# Patient Record
Sex: FEMALE | Race: WHITE | NOT HISPANIC OR LATINO | URBAN - METROPOLITAN AREA
[De-identification: names, ages, dates, MRNs, and addresses within clinical notes are randomized per-mention and may not be internally consistent; named-entity substitution may affect disease eponyms.]

---
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
## 2022-01-12 ENCOUNTER — OFFICE VISIT (OUTPATIENT)
Dept: FAMILY MEDICINE | Facility: CLINIC | Age: 22
End: 2022-01-12

## 2022-01-12 VITALS
TEMPERATURE: 97.8 F | BODY MASS INDEX: 23.25 KG/M2 | DIASTOLIC BLOOD PRESSURE: 77 MMHG | HEART RATE: 79 BPM | HEIGHT: 69 IN | RESPIRATION RATE: 18 BRPM | WEIGHT: 157 LBS | SYSTOLIC BLOOD PRESSURE: 123 MMHG | OXYGEN SATURATION: 97 %

## 2022-01-12 DIAGNOSIS — Z13.220 SCREENING FOR LIPID DISORDERS: ICD-10-CM

## 2022-01-12 DIAGNOSIS — F90.9 ATTENTION DEFICIT HYPERACTIVITY DISORDER (ADHD), UNSPECIFIED ADHD TYPE: ICD-10-CM

## 2022-01-12 DIAGNOSIS — Z11.3 SCREEN FOR STD (SEXUALLY TRANSMITTED DISEASE): ICD-10-CM

## 2022-01-12 DIAGNOSIS — D50.9 IRON DEFICIENCY ANEMIA, UNSPECIFIED IRON DEFICIENCY ANEMIA TYPE: ICD-10-CM

## 2022-01-12 DIAGNOSIS — F33.42 RECURRENT MAJOR DEPRESSIVE DISORDER, IN FULL REMISSION (H): ICD-10-CM

## 2022-01-12 DIAGNOSIS — Z13.1 SCREENING FOR DIABETES MELLITUS: ICD-10-CM

## 2022-01-12 DIAGNOSIS — Z00.00 ROUTINE GENERAL MEDICAL EXAMINATION AT A HEALTH CARE FACILITY: Primary | ICD-10-CM

## 2022-01-12 DIAGNOSIS — Z13.228 SCREENING FOR ENDOCRINE, METABOLIC AND IMMUNITY DISORDER: ICD-10-CM

## 2022-01-12 DIAGNOSIS — Z86.59 HISTORY OF EATING DISORDER: ICD-10-CM

## 2022-01-12 DIAGNOSIS — Z71.89 ADVANCED CARE PLANNING/COUNSELING DISCUSSION: ICD-10-CM

## 2022-01-12 DIAGNOSIS — Z13.0 SCREENING FOR ENDOCRINE, METABOLIC AND IMMUNITY DISORDER: ICD-10-CM

## 2022-01-12 DIAGNOSIS — Z13.29 SCREENING FOR ENDOCRINE, METABOLIC AND IMMUNITY DISORDER: ICD-10-CM

## 2022-01-12 LAB
% GRANULOCYTES: 60.5 % (ref 42.2–75.2)
HCT VFR BLD AUTO: 46.7 % (ref 35–46)
HEMOGLOBIN: 15.3 G/DL (ref 11.8–15.5)
LYMPHOCYTES NFR BLD AUTO: 30.6 % (ref 20.5–51.1)
MCH RBC QN AUTO: 28.8 PG (ref 27–31)
MCHC RBC AUTO-ENTMCNC: 32.8 G/DL (ref 33–37)
MCV RBC AUTO: 87.8 FL (ref 80–100)
MONOCYTES NFR BLD AUTO: 8.9 % (ref 1.7–9.3)
PLATELET # BLD AUTO: 287 K/UL (ref 140–450)
RBC # BLD AUTO: 5.32 X10/CMM (ref 3.7–5.2)
WBC # BLD AUTO: 6 X10/CMM (ref 3.8–11)

## 2022-01-12 PROCEDURE — 99385 PREV VISIT NEW AGE 18-39: CPT | Performed by: NURSE PRACTITIONER

## 2022-01-12 PROCEDURE — 36415 COLL VENOUS BLD VENIPUNCTURE: CPT | Performed by: NURSE PRACTITIONER

## 2022-01-12 PROCEDURE — 85025 COMPLETE CBC W/AUTO DIFF WBC: CPT | Performed by: NURSE PRACTITIONER

## 2022-01-12 RX ORDER — NORETHINDRONE ACETATE AND ETHINYL ESTRADIOL 1MG-20(21)
1 KIT ORAL DAILY
COMMUNITY
End: 2022-04-06

## 2022-01-12 RX ORDER — LISDEXAMFETAMINE DIMESYLATE 40 MG/1
40 CAPSULE ORAL EVERY MORNING
COMMUNITY
End: 2022-04-06

## 2022-01-12 ASSESSMENT — ANXIETY QUESTIONNAIRES
1. FEELING NERVOUS, ANXIOUS, OR ON EDGE: NOT AT ALL
2. NOT BEING ABLE TO STOP OR CONTROL WORRYING: NOT AT ALL
GAD7 TOTAL SCORE: 0
7. FEELING AFRAID AS IF SOMETHING AWFUL MIGHT HAPPEN: NOT AT ALL
3. WORRYING TOO MUCH ABOUT DIFFERENT THINGS: NOT AT ALL
IF YOU CHECKED OFF ANY PROBLEMS ON THIS QUESTIONNAIRE, HOW DIFFICULT HAVE THESE PROBLEMS MADE IT FOR YOU TO DO YOUR WORK, TAKE CARE OF THINGS AT HOME, OR GET ALONG WITH OTHER PEOPLE: NOT DIFFICULT AT ALL
5. BEING SO RESTLESS THAT IT IS HARD TO SIT STILL: NOT AT ALL
6. BECOMING EASILY ANNOYED OR IRRITABLE: NOT AT ALL

## 2022-01-12 ASSESSMENT — PATIENT HEALTH QUESTIONNAIRE - PHQ9
SUM OF ALL RESPONSES TO PHQ QUESTIONS 1-9: 0
5. POOR APPETITE OR OVEREATING: NOT AT ALL

## 2022-01-12 ASSESSMENT — MIFFLIN-ST. JEOR: SCORE: 1531.53

## 2022-01-12 NOTE — PATIENT INSTRUCTIONS
Recommend Vitamin D3 2000 international unit(s) daily (can get at the store in pill or gummy form)    Preventive Health Recommendations  Female Ages 21 to 25     Yearly exam:     See your health care provider every year in order to  o Review health changes.   o Discuss preventive care.    o Review your medicines if your doctor has prescribed any.      You should be tested each year for STDs (sexually transmitted diseases).       Talk to your provider about how often you should have cholesterol testing.      Get a Pap test every three years. If you have an abnormal result, your doctor may have you test more often.      If you are at risk for diabetes, you should have a diabetes test (fasting glucose).     Shots:     Get a flu shot each year.     Get a tetanus shot every 10 years.     Consider getting the shot (vaccine) that prevents cervical cancer (Gardasil).    Nutrition:     Eat at least 5 servings of fruits and vegetables each day.    Eat whole-grain bread, whole-wheat pasta and brown rice instead of white grains and rice.    Get adequate Calcium and Vitamin D.     Lifestyle    Exercise at least 150 minutes a week each week (30 minutes a day, 5 days a week). This will help you control your weight and prevent disease.    Limit alcohol to one drink per day.    No smoking.     Wear sunscreen to prevent skin cancer.    See your dentist every six months for an exam and cleaning.    Thank you for coming in today!     We are working to improve our digital reputation.  Would you please help us by reviewing our clinic on [employer] and/or [employer]?  These are links for filling out a review for the clinic:    [email or web address]                 [email or web address]    We truly appreciate you taking the time to do this.     General Information:    Today you had your appointment with Ciarra Hollis CNP  My hours are:    Monday 8 AM - 5 PM  Wednesday: 8 AM - 5 PM  Thursday:  8 AM - 5 PM  Fridays: 8 AM - 5 PM    I am not in the office Tuesdays. Therefore non urgent calls received on Tuesday will be addressed when I am back in the office on Wednesday.     If lab work was done today as part of your evaluation you will generally be contacted via My Chart, mail, or phone with the results within 1-5 days. If there is an alarming result we will contact you by phone. Lab results come back at varying times, I generally wait until all labs are resulted before making comments on results. Please note labs are automatically released to My Chart once available.     If you need refills please contact your pharmacy They will send a refill request to me to review. Please allow 3 business days for us to process all refill requests.     Please call or send a medical message with any questions or concerns

## 2022-01-12 NOTE — PROGRESS NOTES
"Female Preventive Health Visit    SUBJECTIVE:    This 21 year old female presents for a routine preventive physical exam.    The patient has the following concerns:   1. Iron deficiency anemia - taking Ferrous Sulfate 325 mg daily. History of eating disorder - anorexia, bulimia, over-exercising. Knows her triggers but uncomfortable discussing weight.   2. Mental Health - taking Sertraline 75 mg daily for depression. Takes Vyvanse for ADHD. Currently prescribed by provider at her school.   PHQ 2022   PHQ-9 Total Score 0   Q9: Thoughts of better off dead/self-harm past 2 weeks Not at all     EVELIA-7 SCORE 2022   Total Score 0       OB/Gyn History:      Last Pap Smear:  \"in the last 3 years\" at [facility] in [place]. MARCELA signed for records. No history of abnormal pap smears.  Gyn Surgery:  None.  Current Contraceptive Method:  Junel combination ocp & condoms when having sex. Not currently sexually active.    Patient's medications, allergies, past medical, surgical, social and family histories were reviewed and updated as appropriate.    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 Has one semester left. Mother goes to [employer]. Patient is an only child.       Health Maintenance:  Health maintenance alerts were reviewed and updated as appropriate.    Do you get at least three servings of calcium containing foods daily " "(dairy, green leafy vegetables, etc.)? yes    Amount of exercise or daily activities, outside of work: 3 day(s) per week    Problems taking medications regularly No    Medication side effects: No    Have you had an eye exam in the past two years? yes    Do you see a dentist twice per year? yes    Do you have sleep apnea, excessive snoring or daytime drowsiness?no    OBJECTIVE:  Vitals:    01/12/22 0811   BP: 123/77   Pulse: 79   Resp: 18   Temp: 97.8  F (36.6  C)   TempSrc: Temporal   SpO2: 97%   Weight: 71.2 kg (157 lb)   Height: 1.753 m (5' 9\")    Body mass index is 23.18 kg/m .  General: Young adult female, no acute distress, cooperative with exam.  Eyes: no injection or drainage. PERRLA  Ears: TM's and canals show no erythema, discharge, or effusion bilaterally.  Throat: moist mucous membranes, no tonsillar exudate or hypertrophy, posterior oral pharynx non-erythematous without lesions.  Neck: supple, no thyroid nodules or enlargement.  Breasts: bilateral breasts appear normal, no suspicious masses, no skin or nipple changes  Lungs: clear to auscultation bilaterally, normal respiratory effort.  Heart: regular rate & rhythm, normal S1 and S2, no murmurs.   Abdomen: normal bowel sounds, nontender, no palpable organomegaly.  Genitourinary: declined exam due to having period currently  Extremities: warm, perfused, no swelling or edema.  Neuro: grossly intact   Psych: mental status normal, mood and affect appropriate.    ASSESSMENT / PLAN:  This 21 year old female presents for a routine preventive physical exam. Preventive health topics discussed including adequate exercise and healthy diet. Return to clinic in one year for preventative exam or sooner with any other concerns.  Other issues discussed as noted below.    Cara was seen today for consult, physical and std.    Diagnoses and all orders for this visit:    Routine general medical examination at a health care facility  Age-appropriate preventative health " "maintenance along with diet, exercise and healthy weight discussed. MARCELA signed for pap records and immunization records    Iron deficiency anemia, unspecified iron deficiency anemia type  -     CBC with Diff/Plt ([facility])  -     Ferritin  Serum ([facility])  -     Iron and TIBC ([facility])  -     VENOUS COLLECTION  Taking daily ferrous sulfate. Thinks in part due to eating disorder. History of heavy periods but not as much since on contraceptive. Would like levels checked today    Attention deficit hyperactivity disorder (ADHD), unspecified ADHD type  Taking Vyvanse 40 mg daily with good effect. Prescribed by provider at her school. Eventually wants to switch prescription to our clinic.    Recurrent major depressive disorder, in full remission (H)  -     sertraline (ZOLOFT) 50 MG tablet; Take 1.5 tablets (75 mg) by mouth daily  -     DEPRESSION ACTION PLAN (DAP)  Sertraline 75 mg helping with symptoms    History of eating disorder  Known issue that I take into account for her medical decisions, no current exacerbations or new concerns.    Screen for STD (sexually transmitted disease)  -     Chlamydia/GC Amplification ([facility])  -     HIV 1/0/2 Rflx ([facility])  -     HCV Antibody ([facility])  -     Treponema pallidum Antibodies ([facility])  -     VENOUS COLLECTION    Screening for lipid disorders  -     Lipid Panel ([facility])  -     VENOUS COLLECTION    Screening for diabetes mellitus  -     Comp. Metabolic Panel (14) ([facility])  -     VENOUS COLLECTION    Screening for endocrine, metabolic and immunity disorder  -     CBC with Diff/Plt ([facility])  -     TSH ([facility])  -     VENOUS COLLECTION    Advanced care planning/counseling discussion      BMI:   Estimated body mass index is 23.18 kg/m  as calculated from the following:    Height as of this encounter: 1.753 m (5' 9\").    Weight as of this encounter: 71.2 kg (157 lb).       The patient indicates understanding of these issues and agrees with the plan.    MANUELA Valerio CNP on " 1/12/2022 at 8:15 AM

## 2022-01-13 ASSESSMENT — ANXIETY QUESTIONNAIRES: GAD7 TOTAL SCORE: 0

## 2022-01-14 ENCOUNTER — TRANSFERRED RECORDS (OUTPATIENT)
Dept: FAMILY MEDICINE | Facility: CLINIC | Age: 22
End: 2022-01-14

## 2022-01-15 LAB
ALBUMIN SERPL-MCNC: 4.8 G/DL (ref 3.9–5)
ALBUMIN/GLOB SERPL: 2.1 {RATIO} (ref 1.2–2.2)
ALP SERPL-CCNC: 66 IU/L (ref 44–121)
ALT SERPL-CCNC: 10 IU/L (ref 0–32)
AST SERPL-CCNC: 13 IU/L (ref 0–40)
BILIRUB SERPL-MCNC: 0.2 MG/DL (ref 0–1.2)
BUN SERPL-MCNC: 11 MG/DL (ref 6–20)
BUN/CREATININE RATIO: 19 (ref 9–23)
C TRACH DNA SPEC QL PROBE+SIG AMP: NEGATIVE
CALCIUM SERPL-MCNC: 9.7 MG/DL (ref 8.7–10.2)
CHLORIDE SERPLBLD-SCNC: 101 MMOL/L (ref 96–106)
CHOLEST SERPL-MCNC: 201 MG/DL (ref 100–199)
CREAT SERPL-MCNC: 0.59 MG/DL (ref 0.57–1)
EGFR IF AFRICN AM: 149 ML/MIN/1.73
EGFR IF NONAFRICN AM: 130 ML/MIN/1.73
FERRITIN SERPL-MCNC: 93 NG/ML (ref 15–150)
GLOBULIN, TOTAL: 2.3 G/DL (ref 1.5–4.5)
GLUCOSE SERPL-MCNC: 86 MG/DL (ref 65–99)
HDLC SERPL-MCNC: 60 MG/DL
IRON BINDING CAP: 414 UG/DL (ref 250–450)
IRON SATURATION: 14 % (ref 15–55)
IRON: 59 UG/DL (ref 27–159)
LDL/HDL RATIO: 2 RATIO (ref 0–3.2)
LDLC SERPL CALC-MCNC: 121 MG/DL (ref 0–99)
N GONORRHOEA DNA SPEC QL PROBE+SIG AMP: NEGATIVE
POTASSIUM SERPL-SCNC: 4.2 MMOL/L (ref 3.5–5.2)
PROT SERPL-MCNC: 7.1 G/DL (ref 6–8.5)
SODIUM SERPL-SCNC: 140 MMOL/L (ref 134–144)
TOTAL CO2: 24 MMOL/L (ref 20–29)
TRIGL SERPL-MCNC: 110 MG/DL (ref 0–149)
TSH BLD-ACNC: 4.32 UIU/ML (ref 0.45–4.5)
UIBC: 355 UG/DL (ref 131–425)
VLDLC SERPL CALC-MCNC: 20 MG/DL (ref 5–40)

## 2022-01-16 LAB
HCV AB SERPL QL IA: <0.1 S/CO RATIO (ref 0–0.9)
HIV SCREEN 4TH GEN WITH RFLX: NON REACTIVE
T PALLIDUM IGG SER QL: NON REACTIVE

## 2022-03-01 ENCOUNTER — MYC MEDICAL ADVICE (OUTPATIENT)
Dept: FAMILY MEDICINE | Facility: CLINIC | Age: 22
End: 2022-03-01

## 2022-03-01 DIAGNOSIS — Z30.41 ENCOUNTER FOR SURVEILLANCE OF CONTRACEPTIVE PILLS: Primary | ICD-10-CM

## 2022-03-02 RX ORDER — NORETHINDRONE ACETATE AND ETHINYL ESTRADIOL 1MG-20(21)
1 KIT ORAL DAILY
Qty: 84 TABLET | Refills: 4 | Status: SHIPPED | OUTPATIENT
Start: 2022-03-02 | End: 2023-01-30

## 2022-03-02 NOTE — TELEPHONE ENCOUNTER
MyChart refill request for OCP-Junel. Last office visit CPX with Ciarra [place] CNP 1/12/22. Refill entered for Junel and routed to Yamini for review. Call placed and message left for patient regarding Vyvanse and what date she is requesting refill for Vyvanse. Per [place]  fill [place] below:    Abena Jean

## 2022-04-04 RX ORDER — LISDEXAMFETAMINE DIMESYLATE 40 MG/1
40 CAPSULE ORAL EVERY MORNING
Status: CANCELLED | OUTPATIENT
Start: 2022-04-04

## 2022-04-06 ENCOUNTER — OFFICE VISIT (OUTPATIENT)
Dept: FAMILY MEDICINE | Facility: CLINIC | Age: 22
End: 2022-04-06

## 2022-04-06 VITALS
OXYGEN SATURATION: 100 % | HEART RATE: 52 BPM | SYSTOLIC BLOOD PRESSURE: 118 MMHG | BODY MASS INDEX: 24.22 KG/M2 | RESPIRATION RATE: 16 BRPM | WEIGHT: 164 LBS | DIASTOLIC BLOOD PRESSURE: 78 MMHG

## 2022-04-06 DIAGNOSIS — F90.9 ATTENTION DEFICIT HYPERACTIVITY DISORDER (ADHD), UNSPECIFIED ADHD TYPE: Primary | ICD-10-CM

## 2022-04-06 DIAGNOSIS — Z79.899 CONTROLLED SUBSTANCE AGREEMENT SIGNED: ICD-10-CM

## 2022-04-06 DIAGNOSIS — F33.42 RECURRENT MAJOR DEPRESSIVE DISORDER, IN FULL REMISSION (H): ICD-10-CM

## 2022-04-06 DIAGNOSIS — F41.9 ANXIETY: ICD-10-CM

## 2022-04-06 DIAGNOSIS — Z23 NEED FOR TDAP VACCINATION: ICD-10-CM

## 2022-04-06 LAB
AMPHETAMINES (AMP) UR: NORMAL
BARBITURATES (BAR) UR: NEGATIVE
BENZODIAZEPINES (BZO) UR: NEGATIVE
BUPRENORPHINE (BUP) UR: NEGATIVE
CANNABINOIDS (THC) UR: NEGATIVE
COCAINE (COC) UR: NEGATIVE
MDMA UR: NEGATIVE
METHADONE (MTD) UR: NEGATIVE
METHAMPHETAMINE (METHA) UR: NEGATIVE
MORPH/OPIATES (MOP) UR: NEGATIVE
OXYCODONE (OXYCO) UR: NEGATIVE
PCP UR: NEGATIVE
SPECIMEN VALIDITY INTERNAL QC UR: NORMAL
SPECIMEN VALIDITY TEMPERATURE UR: NORMAL
SPECIMEN VALIDITY UR CREATININE: NORMAL MG/DL (ref 20–200)
SPECIMEN VALIDITY UR PH: 7 (ref 4–9)
SPECIMEN VALIDITY UR SPECIFIC GRAVITY: 1.02 (ref 1–1.02)

## 2022-04-06 PROCEDURE — 99214 OFFICE O/P EST MOD 30 MIN: CPT | Mod: 25 | Performed by: NURSE PRACTITIONER

## 2022-04-06 PROCEDURE — 80306 DRUG TEST PRSMV INSTRMNT: CPT | Performed by: NURSE PRACTITIONER

## 2022-04-06 PROCEDURE — 90471 IMMUNIZATION ADMIN: CPT | Mod: 59 | Performed by: NURSE PRACTITIONER

## 2022-04-06 PROCEDURE — 90715 TDAP VACCINE 7 YRS/> IM: CPT | Performed by: NURSE PRACTITIONER

## 2022-04-06 RX ORDER — FERROUS SULFATE 325(65) MG
TABLET ORAL
COMMUNITY
Start: 2022-04-06

## 2022-04-06 RX ORDER — LISDEXAMFETAMINE DIMESYLATE 40 MG/1
40 CAPSULE ORAL DAILY
Qty: 30 CAPSULE | Refills: 0 | Status: SHIPPED | OUTPATIENT
Start: 2022-05-07 | End: 2022-06-06

## 2022-04-06 RX ORDER — LISDEXAMFETAMINE DIMESYLATE 40 MG/1
40 CAPSULE ORAL DAILY
Qty: 30 CAPSULE | Refills: 0 | Status: SHIPPED | OUTPATIENT
Start: 2022-04-06 | End: 2022-05-06

## 2022-04-06 RX ORDER — LISDEXAMFETAMINE DIMESYLATE 40 MG/1
40 CAPSULE ORAL DAILY
Qty: 30 CAPSULE | Refills: 0 | Status: SHIPPED | OUTPATIENT
Start: 2022-06-07 | End: 2022-07-08

## 2022-04-06 ASSESSMENT — ANXIETY QUESTIONNAIRES
5. BEING SO RESTLESS THAT IT IS HARD TO SIT STILL: NOT AT ALL
IF YOU CHECKED OFF ANY PROBLEMS ON THIS QUESTIONNAIRE, HOW DIFFICULT HAVE THESE PROBLEMS MADE IT FOR YOU TO DO YOUR WORK, TAKE CARE OF THINGS AT HOME, OR GET ALONG WITH OTHER PEOPLE: SOMEWHAT DIFFICULT
2. NOT BEING ABLE TO STOP OR CONTROL WORRYING: SEVERAL DAYS
7. FEELING AFRAID AS IF SOMETHING AWFUL MIGHT HAPPEN: NOT AT ALL
6. BECOMING EASILY ANNOYED OR IRRITABLE: NOT AT ALL
6. BECOMING EASILY ANNOYED OR IRRITABLE: NOT AT ALL
7. FEELING AFRAID AS IF SOMETHING AWFUL MIGHT HAPPEN: NOT AT ALL
3. WORRYING TOO MUCH ABOUT DIFFERENT THINGS: SEVERAL DAYS
2. NOT BEING ABLE TO STOP OR CONTROL WORRYING: SEVERAL DAYS
IF YOU CHECKED OFF ANY PROBLEMS ON THIS QUESTIONNAIRE, HOW DIFFICULT HAVE THESE PROBLEMS MADE IT FOR YOU TO DO YOUR WORK, TAKE CARE OF THINGS AT HOME, OR GET ALONG WITH OTHER PEOPLE: SOMEWHAT DIFFICULT
3. WORRYING TOO MUCH ABOUT DIFFERENT THINGS: SEVERAL DAYS
1. FEELING NERVOUS, ANXIOUS, OR ON EDGE: SEVERAL DAYS
1. FEELING NERVOUS, ANXIOUS, OR ON EDGE: SEVERAL DAYS
4. TROUBLE RELAXING: NOT AT ALL
GAD7 TOTAL SCORE: 3
GAD7 TOTAL SCORE: 3
5. BEING SO RESTLESS THAT IT IS HARD TO SIT STILL: NOT AT ALL

## 2022-04-06 ASSESSMENT — PATIENT HEALTH QUESTIONNAIRE - PHQ9
SUM OF ALL RESPONSES TO PHQ QUESTIONS 1-9: 2
5. POOR APPETITE OR OVEREATING: NOT AT ALL

## 2022-04-06 NOTE — PATIENT INSTRUCTIONS
Vyvanse sent in for 3 months. Let me know when running out with call or [employer] message. Then I will send in another 3 months. Plan to follow-up every 6 months, sooner if having concerns.    Thank you for coming in today!     We are working to improve our digital reputation.  Would you please help us by reviewing our clinic on Google and/or Facebook?  These are links for filling out a review for the clinic:    [email or web address]                 [email or web address]    We truly appreciate you taking the time to do this.     General Information:    Today you had your appointment with Ciarra [place] CNP  My hours are:    Monday 8 AM - 5 PM  Wednesday: 8 AM - 5 PM  Thursday: 8 AM - 5 PM  Fridays: 8 AM - 5 PM    I am not in the office Tuesdays. Therefore non urgent calls received on Tuesday will be addressed when I am back in the office on Wednesday.     If lab work was done today as part of your evaluation you will generally be contacted via My Chart, mail, or phone with the results within 1-5 days. If there is an alarming result we will contact you by phone. Lab results come back at varying times, I generally wait until all labs are resulted before making comments on results. Please note labs are automatically released to My Chart once available.     If you need refills please contact your pharmacy They will send a refill request to me to review. Please allow 3 business days for us to process all refill requests.     Please call or send a medical message with any questions or concerns

## 2022-04-06 NOTE — PROGRESS NOTES
Problem(s) Oriented visit        SUBJECTIVE:                                                    Cara Payne is a 21 year old female who presents to clinic today for the following health issues :    Depression/anxiety - having more anxiety recently due to stress. Taking Sertraline 75 mg daily. In the past has not felt well with higher doses.   EVELIA-7 SCORE 1/12/2022 4/6/2022 4/6/2022   Total Score 0 3 3     PHQ 1/12/2022 4/6/2022 4/6/2022   PHQ-9 Total Score 0 2 -   Q9: Thoughts of better off dead/self-harm past 2 weeks Not at all Not at all Not at all     ADHD - taking Vyvanse 40 mg daily. Feels as though this medication and dose helps her concentrate and focus better. No appetite suppression or sleep issues.     Problem list, Medication list, Allergies, and Medical/Social/Surgical histories reviewed in EPIC and updated as appropriate.   Additional history: as documented    ROS:  3 point ROS completed and negative except noted above, including Gen, Neuro, Psych    OBJECTIVE:                                                    /78   Pulse 52   Resp 16   Wt 74.4 kg (164 lb)   SpO2 100%   BMI 24.22 kg/m    Body mass index is 24.22 kg/m .   GENERAL: healthy, alert and no distress  PSYCH: mentation appears normal, affect normal/bright     ASSESSMENT/PLAN:                                                      Cara was seen today for refill request and gyn exam.    Diagnoses and all orders for this visit:    Attention deficit hyperactivity disorder (ADHD), unspecified ADHD type  -     UScreen Toxisure ([facility])  -     lisdexamfetamine (VYVANSE) 40 MG capsule; Take 1 capsule (40 mg) by mouth daily  -     lisdexamfetamine (VYVANSE) 40 MG capsule; Take 1 capsule (40 mg) by mouth daily  -     lisdexamfetamine (VYVANSE) 40 MG capsule; Take 1 capsule (40 mg) by mouth daily  Controlled substance agreement signed  -     UScreen Toxisure ([facility])  Has previously been prescribed by provider at her school. Switching  prescription to [facility]. Utox, controlled substance agreement, PHQ/EVELIA completed at today's appointment. No change in medication or dose. Discussed follow-ups at least every 6 months, more frequent if having concerns.  PDMP Review       Value Time User    State PDMP site checked  Yes 4/6/2022  4:11 PM Ciarra [place] APRN CNP        Recurrent major depressive disorder, in full remission (H)  -     sertraline (ZOLOFT) 50 MG tablet; Take 1.5 tablets (75 mg) by mouth daily  Anxiety  Prefers to stay on current dose for now    Need for Tdap vaccination  -     VACCINE ADMINISTRATION, INITIAL  -     Cancel: TDAP (ADACEL,BOOSTRIX)  -     TDAP VACCINE        See Patient Instructions  Patient Instructions   Vyvanse sent in for 3 months. Let me know when running out with call or [employer] message. Then I will send in another 3 months. Plan to follow-up every 6 months, sooner if having concerns.    Thank you for coming in today!     We are working to improve our digital reputation.  Would you please help us by reviewing our clinic on [employer]) and/or [employer]?  These are links for filling out a review for the clinic:    [email or web address]                 [email or web address]    We truly appreciate you taking the time to do this.     General Information:    Today you had your appointment with Ciarra [place] CNP  My hours are:    Monday 8 AM - 5 PM  Wednesday: 8 AM - 5 PM  Thursday: 8 AM - 5 PM  Fridays: 8 AM - 5 PM    I am not in the office Tuesdays. Therefore non urgent calls received on Tuesday will be addressed when I am back in the office on Wednesday.     If lab work was done today as part of your evaluation you will generally be contacted via My Chart, mail, or phone with the results within 1-5 days. If there is an alarming result we will contact you by phone. Lab results come back at varying times, I generally wait until all labs are resulted before making comments on  results. Please note labs are automatically released to My Chart once available.     If you need refills please contact your pharmacy They will send a refill request to me to review. Please allow 3 business days for us to process all refill requests.     Please call or send a medical message with any questions or concerns        MANUELA Valerio CNP  [facility]  [facility]  [phone]    For any issues my office # is [phone]

## 2022-04-06 NOTE — LETTER
[facility]  04/06/22  Patient: Cara Payne  YOB: 2000  Medical Record Number: [id number]                                                                                  Non-Opioid Controlled Substance Agreement    This is an agreement between you and your provider regarding safe and appropriate use of controlled substances prescribed by your care team. Controlled substances are?medicines that can cause physical and mental dependence (abuse).     There are strict laws about having and using these medicines. We here at [facility] are  committed to working with you in your efforts to get better. To support you in this work, we'll help you schedule regular office appointments for medicine refills. If we must cancel or change your appointment for any reason, we'll make sure you have enough medicine to last until your next appointment.     As a Provider, I will:     Listen carefully to your concerns while treating you with respect.     Recommend a treatment plan that I believe is in your best interest and may involve therapies other than medicine.      Talk with you often about the possible benefits and the risk of harm of any medicine that we prescribe for you.    Assess the safety of this medicine and check how well it works.      Provide a plan on how to taper (discontinue or go off) using this medicine if the decision is made to stop its use.      ::  As a Patient, I understand controlled substances:       Are prescribed by my care provider to help me function or work and manage my condition(s).?    Are strong medicines and can cause serious side effects.       Need to be taken exactly as prescribed.?Combining controlled substances with certain medicines or chemicals (such as illegal drugs, alcohol, sedatives, sleeping pills, and benzodiazepines) can be dangerous or even fatal.? If I stop taking my medicines suddenly, I may have severe withdrawal symptoms.     The risks,  benefits, and side effects of these medicine(s) were explained to me. I agree that:    1. I will take part in other treatments as advised by my care team. This may be psychiatry or counseling, physical therapy, behavioral therapy, group treatment or a referral to specialist.    2. I will keep all my appointments and understand this is part of the monitoring of controlled substances.?My care team may require an office visit for EVERY controlled substance refill. If I miss appointments or don t follow instructions, my care team may stop my medicine    3. I will take my medicines as prescribed. I will not change the dose or schedule unless my care team tells me to. There will be no refills if I run out early.      4. I may be asked to come to the clinic and complete a urine drug test or complete a pill count. If I don t give a urine sample or participate in a pill count, the care team may stop my medicine.    5. I will only receive controlled substance prescriptions from this clinic. If I am treated by another provider, I will tell them that I am taking controlled substances and that I have a treatment agreement with this provider. I will inform my [facility] care team within one business day if I am given a prescription for any controlled substance by another healthcare provider. My [facility] care team can contact other providers and pharmacists about my use of any medicines.    6. It is up to me to make sure that I don't run out of my medicines on weekends or holidays.?If my care team is willing to refill my prescription without a visit, I must request refills only during office hours. Refills may take up to 3 business days to process. I will use one pharmacy to fill all my controlled substance prescriptions. I will notify the clinic about any changes to my insurance or medicine availability.    7. I am responsible for my prescriptions. If the medicine/prescription is lost, stolen or destroyed, it will  not be replaced.?I also agree not to share controlled substance medicines with anyone.     8. I am aware I should not use any illegal or recreational drugs. I agree not to drink alcohol unless my care team says I can.     9. If I enroll in the [place] Medical Cannabis program, I will tell my care team before my next refill.    10. I will tell my care team right away if I become pregnant, have a new medical problem treated outside of my regular clinic, or have a change in my medicines.     11. I understand that this medicine can affect my thinking, judgment and reaction time.? Alcohol and drugs affect the brain and body, which can affect the safety of my driving. Being under the influence of alcohol or drugs can affect my decision-making, behaviors, personal safety and the safety of others. Driving while impaired (DWI) can occur if a person is driving, operating or in physical control of a car, motorcycle, boat, snowmobile, ATV, motorbike, off-road vehicle or any other motor vehicle ([place] Statute 169A.20). I understand the risk if I choose to drive or operate any vehicle or machinery.    I understand that if I do not follow any of the conditions above, my prescriptions or treatment may be stopped or changed.   I agree that my provider, clinic care team and pharmacy may work with any city, state or federal law enforcement agency that investigates the misuse, sale or other diversion of my controlled medicine. I will allow my provider to discuss my care with, or share a copy of, this agreement with any other treating provider, pharmacy or emergency room where I receive care.     I have read this agreement and have asked questions about anything I did not understand.    ________________________________________________________  Patient Signature - Cara Payne     ___________________                   Date     ________________________________________________________  Provider Signature - MANUELA Valerio CNP        ___________________                   Date     ________________________________________________________  Witness Signature (required if provider not present while patient signing)          ___________________                   Date

## 2022-04-06 NOTE — TELEPHONE ENCOUNTER
Called patient and she wants to switch the prescription to having Yamini fill it.  Appointment  was made for Wed 04/06 .  
Per  last filled for # 30 on 03/08, 02/09, 01/10.   See notes below.   
Prescriptions sent to pharmacy at office visit with Ciarra [place] CNP 4/6/22. Abena Jean   
VYVANSE---LAST SEEN 1/12/22 (RELATED) first fill for our office, no toxisure on file or contract signed.  
When I saw patient for physical in January, Vyvanse was being prescribed by provider at her school. Is she wanting to change this prescription to our clinic now? If so need controlled substance agreement, toxassure, PHQ-9, EVELIA-7.    MANUELA Valerio CNP on 4/4/2022 at 12:12 PM    
DC instructions

## 2022-04-07 ASSESSMENT — ANXIETY QUESTIONNAIRES: GAD7 TOTAL SCORE: 3

## 2022-07-07 ENCOUNTER — OFFICE VISIT (OUTPATIENT)
Dept: FAMILY MEDICINE | Facility: CLINIC | Age: 22
End: 2022-07-07

## 2022-07-07 VITALS
TEMPERATURE: 97.7 F | HEART RATE: 95 BPM | SYSTOLIC BLOOD PRESSURE: 118 MMHG | DIASTOLIC BLOOD PRESSURE: 77 MMHG | OXYGEN SATURATION: 99 %

## 2022-07-07 DIAGNOSIS — F33.42 RECURRENT MAJOR DEPRESSIVE DISORDER, IN FULL REMISSION (H): ICD-10-CM

## 2022-07-07 DIAGNOSIS — F90.9 ATTENTION DEFICIT HYPERACTIVITY DISORDER (ADHD), UNSPECIFIED ADHD TYPE: Primary | Chronic | ICD-10-CM

## 2022-07-07 DIAGNOSIS — Z11.3 SCREEN FOR STD (SEXUALLY TRANSMITTED DISEASE): ICD-10-CM

## 2022-07-07 DIAGNOSIS — Z12.4 PAP SMEAR FOR CERVICAL CANCER SCREENING: ICD-10-CM

## 2022-07-07 LAB
CLUE CELLS: POSITIVE
TRICHOMONAS (WET PREP): NEGATIVE
YEAST (WET PREP): NEGATIVE

## 2022-07-07 PROCEDURE — 87210 SMEAR WET MOUNT SALINE/INK: CPT | Performed by: NURSE PRACTITIONER

## 2022-07-07 PROCEDURE — 99213 OFFICE O/P EST LOW 20 MIN: CPT | Performed by: NURSE PRACTITIONER

## 2022-07-07 RX ORDER — LISDEXAMFETAMINE DIMESYLATE 40 MG/1
40 CAPSULE ORAL DAILY
Qty: 30 CAPSULE | Refills: 0 | Status: SHIPPED | OUTPATIENT
Start: 2022-09-07 | End: 2022-10-06

## 2022-07-07 RX ORDER — LISDEXAMFETAMINE DIMESYLATE 40 MG/1
40 CAPSULE ORAL DAILY
Qty: 30 CAPSULE | Refills: 0 | Status: SHIPPED | OUTPATIENT
Start: 2022-08-07 | End: 2022-09-06

## 2022-07-07 RX ORDER — LISDEXAMFETAMINE DIMESYLATE 40 MG/1
40 CAPSULE ORAL DAILY
Qty: 30 CAPSULE | Refills: 0 | Status: SHIPPED | OUTPATIENT
Start: 2022-07-07 | End: 2022-08-06

## 2022-07-07 NOTE — PROGRESS NOTES
Problem(s) Oriented visit        SUBJECTIVE:                                                    Cara Payne is a 21 year old female who presents to clinic today for the following health issues :    Thinks she is due for a pap smear. Last one done about 2-3 years ago at [facility] and was normal. Also concerned about STD's. Had oral sex with 2 different partners in the last month.     Depression - well controlled with Sertraline 50 mg daily    ADHD - Taking Vyvanse 40 mg daily without any concerns    Problem list, Medication list, Allergies, and Medical/Social/Surgical histories reviewed in [facility] and updated as appropriate.   Additional history: as documented    ROS:  6 point ROS completed and negative except noted above, including Gen, CV, Resp, GI, , Psych    OBJECTIVE:                                                    /77   Pulse 95   Temp 97.7  F (36.5  C)   LMP 07/06/2022 (Exact Date)   SpO2 99%   There is no height or weight on file to calculate BMI.   GENERAL: healthy, alert and no distress   (female): normal female external genitalia, normal urethral meatus , vaginal mucosa pink, moist, well rugated and normal cervix, adnexae, and uterus without masses.  PSYCH: normal affect & mood     ASSESSMENT/PLAN:                                                      Cara was seen today for gyn exam and refill request.    Diagnoses and all orders for this visit:    Attention deficit hyperactivity disorder (ADHD), unspecified ADHD type  -     lisdexamfetamine (VYVANSE) 40 MG capsule; Take 1 capsule (40 mg) by mouth daily for 30 days  -     lisdexamfetamine (VYVANSE) 40 MG capsule; Take 1 capsule (40 mg) by mouth daily for 30 days  -     lisdexamfetamine (VYVANSE) 40 MG capsule; Take 1 capsule (40 mg) by mouth daily for 30 days  ADHD well controlled. Next visit in 6 months.  PDMP Review       Value Time User    State PDMP site checked  Yes 4/6/2022  4:11 PM Ciarra [place] APRN CNP         Recurrent major depressive disorder, in full remission (H)  Continue Sertraline 50 mg daily without changes    Screen for STD (sexually transmitted disease)  -     HIV 1/0/2 Rflx ([facility]); Future  -     HCV Antibody ([facility]); Future  -     Treponema pallidum Antibodies ([facility]); Future  -     Chlamydia/GC Amplification ([facility]); Future  -     Wet Prep ([facility])  Will return in 4 weeks on lab schedule for testing     Pap smear for cervical cancer screening  -     Pap IG LB Rfx HPV ASCU ([facility])  -     Wet Prep ([facility])        See Patient Instructions  Patient Instructions   Schedule lab visit in 4 weeks to STD testing      MANUELA Valerio CNP  [facility]  Family Practice  [facility]  [phone]    For any issues my office # is [phone]

## 2022-07-11 LAB
.: NORMAL
.: NORMAL
DIAGNOSIS:: NORMAL
Lab: NORMAL
Lab: NORMAL
PERFORMED BY:: NORMAL
SOURCE: NORMAL
SPECIMEN ADEQUACY:: NORMAL
TEST METHODOLOGY:: NORMAL

## 2022-08-09 DIAGNOSIS — Z11.3 SCREEN FOR STD (SEXUALLY TRANSMITTED DISEASE): ICD-10-CM

## 2022-08-09 PROCEDURE — 36415 COLL VENOUS BLD VENIPUNCTURE: CPT | Performed by: NURSE PRACTITIONER

## 2022-08-11 LAB
C TRACH DNA SPEC QL PROBE+SIG AMP: NEGATIVE
HCV AB SERPL QL IA: <0.1 S/CO RATIO (ref 0–0.9)
HIV SCREEN 4TH GEN WITH RFLX: NON REACTIVE
N GONORRHOEA DNA SPEC QL PROBE+SIG AMP: NEGATIVE
T PALLIDUM IGG SER QL: NON REACTIVE

## 2022-09-08 ENCOUNTER — VIRTUAL VISIT (OUTPATIENT)
Dept: FAMILY MEDICINE | Facility: CLINIC | Age: 22
End: 2022-09-08

## 2022-09-08 DIAGNOSIS — R05.9 COUGH: Primary | ICD-10-CM

## 2022-09-08 DIAGNOSIS — F90.9 ATTENTION DEFICIT HYPERACTIVITY DISORDER (ADHD), UNSPECIFIED ADHD TYPE: Chronic | ICD-10-CM

## 2022-09-08 PROCEDURE — 99214 OFFICE O/P EST MOD 30 MIN: CPT | Mod: 95 | Performed by: NURSE PRACTITIONER

## 2022-09-08 RX ORDER — DOXYCYCLINE 100 MG/1
100 CAPSULE ORAL 2 TIMES DAILY
Qty: 20 CAPSULE | Refills: 0 | Status: SHIPPED | OUTPATIENT
Start: 2022-09-08 | End: 2022-09-18

## 2022-09-08 RX ORDER — LISDEXAMFETAMINE DIMESYLATE 40 MG/1
40 CAPSULE ORAL DAILY
Qty: 30 CAPSULE | Refills: 0 | Status: SHIPPED | OUTPATIENT
Start: 2022-09-08 | End: 2022-10-06

## 2022-09-08 RX ORDER — LISDEXAMFETAMINE DIMESYLATE 40 MG/1
40 CAPSULE ORAL DAILY
Qty: 30 CAPSULE | Refills: 0 | Status: SHIPPED | OUTPATIENT
Start: 2022-11-09 | End: 2022-12-09

## 2022-09-08 RX ORDER — CODEINE PHOSPHATE AND GUAIFENESIN 10; 100 MG/5ML; MG/5ML
1-2 SOLUTION ORAL EVERY 4 HOURS PRN
Qty: 240 ML | Refills: 0 | Status: SHIPPED | OUTPATIENT
Start: 2022-09-08 | End: 2023-01-30

## 2022-09-08 RX ORDER — LISDEXAMFETAMINE DIMESYLATE 40 MG/1
40 CAPSULE ORAL DAILY
Qty: 30 CAPSULE | Refills: 0 | Status: SHIPPED | OUTPATIENT
Start: 2022-10-09 | End: 2022-10-06

## 2022-09-08 ASSESSMENT — PATIENT HEALTH QUESTIONNAIRE - PHQ9: SUM OF ALL RESPONSES TO PHQ QUESTIONS 1-9: 3

## 2022-09-08 NOTE — PATIENT INSTRUCTIONS
Robitussin with codeine cough syrup: Take 5-10 ml every 4 hours as needed. Best used at bedtime since it can make you drowsy. Take medication as directed. Do not take medication while driving, operating heavy machinery or while drinking alcohol.      Doxycycline 1 tab 2 times daily for 10 days  - can cause sun sensitivity  - stay upright after taking it x 1 hr, or you can get a little acid reflux  - don't swallow w/ dairy     Call if any new/worsening symptoms or if not starting to improve by Monday

## 2022-09-08 NOTE — PROGRESS NOTES
Problem(s) Oriented visit      Video-Visit Details    Type of service:  Video Visit    Video Start Time (time video started): 1442    Video End Time (time video stopped): 1451    Originating Location (pt. Location): Home    Distant Location (provider location):  [facility]     Mode of Communication:  Video Conference via [facility]    Physician has received verbal consent for a Video Visit from the patient? Yes    This was a virtual video-visit conducted during COVID-19 outbreak in regulation with social distancing and quarantine recommendations of the CDC and [place] department of health and human services. A two way audio/video connection was used in real time with patient's consent.    MANUELA Valerio CNP    SUBJECTIVE:                                                    Cara Payne is a 21 year old female who presents to clinic today for the following health issues :    Cough - ongoing for past 4 weeks. Was getting a little better but then worsened again. Productive of sputum. Denies wheezing or shortness of breath. Symptoms started as sinus infection and then progressed. No known fevers. Has tested for Covid 3 times during this month, last being last week, all negative results. Not sleeping due to cough. Very fatigued.    ADHD - Taking Vyvanse 40 mg daily without appetite suppression or usual sleep problems. Needs refill    Problem list, Medication list, Allergies, and Medical/Social/Surgical histories reviewed in [facility] and updated as appropriate.   Additional history: as documented    ROS:  5 point ROS completed and negative except noted above, including Gen, HENT, CV, Resp, GI, MS, Psych    OBJECTIVE:                                                    No vitals taken due to telehealth visit     APPEARANCE: = acutely ill adult female, no distress  Conj/Eyelids = noninjected and lids and lashes are without inflammation  Ears/Nose = External structures and Nares have usual shape and  form  Resp effort =  Intermittent deep cough, no difficulty breathing observed  Recent/Remote Memory = Alert and Oriented x 3  Mood/Affect = Cooperative and interested     ASSESSMENT/PLAN:                                                      Cara was seen today for cough.    Diagnoses and all orders for this visit:    Cough  -     doxycycline hyclate (VIBRAMYCIN) 100 MG capsule; Take 1 capsule (100 mg) by mouth 2 times daily for 10 days  -     guaiFENesin-codeine (ROBITUSSIN AC) 100-10 MG/5ML solution; Take 5-10 mLs by mouth every 4 hours as needed for cough  Suspect bacterial cause to to length of symptoms. Decision to treat with antibiotic. Cough suppressant for nighttime use. Reviewed side effects of medications, alarm signs and symptoms, and when to seek further care.    Attention deficit hyperactivity disorder (ADHD), unspecified ADHD type  -     lisdexamfetamine (VYVANSE) 40 MG capsule; Take 1 capsule (40 mg) by mouth daily for 30 days  -     lisdexamfetamine (VYVANSE) 40 MG capsule; Take 1 capsule (40 mg) by mouth daily for 30 days  -     lisdexamfetamine (VYVANSE) 40 MG capsule; Take 1 capsule (40 mg) by mouth daily for 30 days  PDMP Review       Value Time User    State PDMP site checked  Yes 9/8/2022  3:05 PM Ciarra Hollis APRN CNP      Refilled x 3 months. Next visit in 6 months.       See Patient Instructions  Patient Instructions   Robitussin with codeine cough syrup: Take 5-10 ml every 4 hours as needed. Best used at bedtime since it can make you drowsy. Take medication as directed. Do not take medication while driving, operating heavy machinery or while drinking alcohol.      Doxycycline 1 tab 2 times daily for 10 days  - can cause sun sensitivity  - stay upright after taking it x 1 hr, or you can get a little acid reflux  - don't swallow w/ dairy     Call if any new/worsening symptoms or if not starting to improve by Monday      MANUELA Valerio CNP  [facility]  Practice  [facility]  [phone]    For any issues my office # is [phone]

## 2022-10-06 ENCOUNTER — OFFICE VISIT (OUTPATIENT)
Dept: FAMILY MEDICINE | Facility: CLINIC | Age: 22
End: 2022-10-06

## 2022-10-06 VITALS
TEMPERATURE: 99.2 F | DIASTOLIC BLOOD PRESSURE: 76 MMHG | BODY MASS INDEX: 25.46 KG/M2 | OXYGEN SATURATION: 98 % | HEART RATE: 92 BPM | RESPIRATION RATE: 16 BRPM | SYSTOLIC BLOOD PRESSURE: 102 MMHG | WEIGHT: 172.44 LBS

## 2022-10-06 DIAGNOSIS — R06.2 WHEEZING: ICD-10-CM

## 2022-10-06 DIAGNOSIS — J02.9 SORE THROAT: ICD-10-CM

## 2022-10-06 DIAGNOSIS — R05.2 SUBACUTE COUGH: Primary | ICD-10-CM

## 2022-10-06 LAB
% GRANULOCYTES: 57.2 % (ref 42.2–75.2)
ERYTHROCYTE [SEDIMENTATION RATE] IN BLOOD: 7 MM/HR (ref 0–20)
HCT VFR BLD AUTO: 45.6 % (ref 35–46)
HEMOGLOBIN: 15.6 G/DL (ref 11.8–15.5)
LYMPHOCYTES NFR BLD AUTO: 34.4 % (ref 20.5–51.1)
MCH RBC QN AUTO: 29.6 PG (ref 27–31)
MCHC RBC AUTO-ENTMCNC: 34.3 G/DL (ref 33–37)
MCV RBC AUTO: 86.4 FL (ref 80–100)
MONOCYTES NFR BLD AUTO: 8.4 % (ref 1.7–9.3)
MONONUCLEOSIS SCREEN: NORMAL
PLATELET # BLD AUTO: 276 K/UL (ref 140–450)
RBC # BLD AUTO: 5.27 X10/CMM (ref 3.7–5.2)
S PYO AG THROAT QL IA.RAPID: NEGATIVE
WBC # BLD AUTO: 7 X10/CMM (ref 3.8–11)

## 2022-10-06 PROCEDURE — 86403 PARTICLE AGGLUT ANTBDY SCRN: CPT | Performed by: NURSE PRACTITIONER

## 2022-10-06 PROCEDURE — 85651 RBC SED RATE NONAUTOMATED: CPT | Performed by: NURSE PRACTITIONER

## 2022-10-06 PROCEDURE — 71046 X-RAY EXAM CHEST 2 VIEWS: CPT | Performed by: NURSE PRACTITIONER

## 2022-10-06 PROCEDURE — 36415 COLL VENOUS BLD VENIPUNCTURE: CPT | Performed by: NURSE PRACTITIONER

## 2022-10-06 PROCEDURE — 99214 OFFICE O/P EST MOD 30 MIN: CPT | Performed by: NURSE PRACTITIONER

## 2022-10-06 PROCEDURE — 87430 STREP A AG IA: CPT | Performed by: NURSE PRACTITIONER

## 2022-10-06 PROCEDURE — 85025 COMPLETE CBC W/AUTO DIFF WBC: CPT | Performed by: NURSE PRACTITIONER

## 2022-10-06 RX ORDER — ALBUTEROL SULFATE 90 UG/1
1-2 AEROSOL, METERED RESPIRATORY (INHALATION) EVERY 4 HOURS PRN
Qty: 18 G | Refills: 0 | Status: SHIPPED | OUTPATIENT
Start: 2022-10-06 | End: 2024-09-23

## 2022-10-06 RX ORDER — PREDNISONE 20 MG/1
TABLET ORAL
Qty: 12 TABLET | Refills: 0 | Status: SHIPPED | OUTPATIENT
Start: 2022-10-06 | End: 2023-01-30

## 2022-10-06 NOTE — PROGRESS NOTES
Problem(s) Oriented visit        SUBJECTIVE:                                                    Cara Payne is a 21 year old female who presents to clinic today for the following health issues :    Has been sick for over a month. Seen via virtual visit on 9/8. Prescribed Doxycycline antibiotic and robitussin with codeine. Does not feelas though she has improved and now has worsening symptoms. Can't sleep even though taking robitussin with codeine. Coughing constantly. Feels like there's fluid in her lungs. Short of breath with activity. Also feels very fatigued and has a sore throat. Continues to have low grade fevers.    Problem list, Medication list, Allergies, and Medical/Social/Surgical histories reviewed in [facility] and updated as appropriate.   Additional history: as documented    ROS:  5 point ROS completed and negative except noted above, including Gen, HENT, CV, Resp, GI    OBJECTIVE:                                                    /76   Pulse 92   Temp 99.2  F (37.3  C)   Resp 16   Wt 78.2 kg (172 lb 7 oz)   LMP 09/29/2022 (Exact Date)   SpO2 98%   BMI 25.46 kg/m    Body mass index is 25.46 kg/m .   GENERAL: Acutely ill young adult female, no distress  EYES: Eyes grossly normal to inspection  HENT: ear canals and TM's normal, nasal mucosa edematous , oropharynx clear, tonsillar hypertrophy and tonsillar erythema  NECK: bilateral anterior cervical adenopathy and no asymmetry, masses, or scars  RESP: rhonchi throughout with inspiratory wheezing in upper lobes  CV: regular rates and rhythm, normal S1 S2, no S3 or S4 and no murmur, click or rub  SKIN: no suspicious lesions or rashes  PSYCH: normal affect & mood    Rapid strep: negative  Rapid mono: negative  CBC RESULTS: Recent Labs   Lab Test 10/06/22  0000 01/12/22  0000 10/21/17  1737   WBC 7.0   < > 9.0   RBC 5.27*   < > 4.79   HGB 15.6*   < > 13.9   HCT 45.6   < > 40.5   MCV 86.4   < > 85   MCH 29.6   < > 29.0   MCHC 34.3   < > 34.3    RDW  --   --  13.5      < > 296    < > = values in this interval not displayed.     Chest x-ray: appears clear without infiltrate     ASSESSMENT/PLAN:                                                      Cara was seen today for cough.    Diagnoses and all orders for this visit:    Subacute cough  -     CBC with Diff/Plt ([facility])  -     X-ray Chest 2 vws*  -     C-Reactive Protein  Quant ([facility])  -     Sed Rate Westergren ([facility])  -     VENOUS COLLECTION  -     predniSONE (DELTASONE) 20 MG tablet; Take 2 tabs (40 mg) daily for 4 days, then 1 tab (20 mg) daily for 4 days  -     albuterol (PROAIR HFA/PROVENTIL HFA/VENTOLIN HFA) 108 (90 Base) MCG/ACT inhaler; Inhale 1-2 puffs into the lungs every 4 hours as needed for shortness of breath / dyspnea or wheezing    Wheezing  -     albuterol (PROAIR HFA/PROVENTIL HFA/VENTOLIN HFA) 108 (90 Base) MCG/ACT inhaler; Inhale 1-2 puffs into the lungs every 4 hours as needed for shortness of breath / dyspnea or wheezing    Sore throat  -     Mono ([facility])  -     Rapid Strep ([facility])  -     VENOUS COLLECTION    Initial testing for strep, mono and pneumonia appear to be negative. Lung sounds coarse and suspect bronchitis, post-viral cough. Prescriptions for Prednisone and Albuterol inhaler sent to pharmacy. Reviewed side effects of medications, alarm signs and symptoms, and when to seek further care. Will call patient in 4 days to follow-up.      See Patient Instructions  Patient Instructions   Prednisone: Take 2 tablets once daily for 4 days then 1 tablet once daily for 4 days. Take in the morning with food.    For wheezing/coughing, use prescribed Albuterol inhaler with aerochamber or spacer device as instructed.  Dosing is 1-2 puffs every 4 hours as needed for wheezing/coughing            MANUELA Valerio CNP  [facility]  Family Practice  [facility]  [phone]    For any issues my office # is [phone]

## 2022-10-06 NOTE — PATIENT INSTRUCTIONS
Prednisone: Take 2 tablets once daily for 4 days then 1 tablet once daily for 4 days. Take in the morning with food.    For wheezing/coughing, use prescribed Albuterol inhaler with aerochamber or spacer device as instructed.  Dosing is 1-2 puffs every 4 hours as needed for wheezing/coughing

## 2022-10-07 LAB — CRP SERPL-MCNC: 3 MG/L (ref 0–10)

## 2022-11-19 ENCOUNTER — HEALTH MAINTENANCE LETTER (OUTPATIENT)
Age: 22
End: 2022-11-19

## 2022-12-28 DIAGNOSIS — F90.9 ATTENTION DEFICIT HYPERACTIVITY DISORDER (ADHD), UNSPECIFIED ADHD TYPE: Primary | Chronic | ICD-10-CM

## 2022-12-28 RX ORDER — LISDEXAMFETAMINE DIMESYLATE 40 MG/1
40 CAPSULE ORAL EVERY MORNING
Qty: 30 CAPSULE | Refills: 0 | Status: SHIPPED | OUTPATIENT
Start: 2022-12-28 | End: 2023-05-10

## 2022-12-28 NOTE — TELEPHONE ENCOUNTER
Patient is requesting refill on Vyvanse 40mg #30 si po qd to fill now as [place]. Has follow up appt with Ciarra Hollis CNP 23.    Last related visit: 22 virtual visit with Ciarra Hollis CNP   Controlled Substance Agreement: 22  Urine drug screen: 22  Last filled Vyvanse 22        Plan: routed to Ciarra Hollis CNP for review.  Linda Diamond RN

## 2022-12-28 NOTE — TELEPHONE ENCOUNTER
Vyvanse--will be out of the country from 1/3 to the 10th.  Has appt scheduled for 1/11/23.  [employer] and contract signed on 4/6/22

## 2023-01-30 ENCOUNTER — OFFICE VISIT (OUTPATIENT)
Dept: FAMILY MEDICINE | Facility: CLINIC | Age: 23
End: 2023-01-30

## 2023-01-30 VITALS
OXYGEN SATURATION: 98 % | HEART RATE: 98 BPM | BODY MASS INDEX: 24.66 KG/M2 | DIASTOLIC BLOOD PRESSURE: 84 MMHG | WEIGHT: 167 LBS | SYSTOLIC BLOOD PRESSURE: 132 MMHG

## 2023-01-30 DIAGNOSIS — F90.9 ATTENTION DEFICIT HYPERACTIVITY DISORDER (ADHD), UNSPECIFIED ADHD TYPE: Primary | Chronic | ICD-10-CM

## 2023-01-30 DIAGNOSIS — R53.83 FATIGUE, UNSPECIFIED TYPE: ICD-10-CM

## 2023-01-30 DIAGNOSIS — F33.42 RECURRENT MAJOR DEPRESSIVE DISORDER, IN FULL REMISSION (H): ICD-10-CM

## 2023-01-30 DIAGNOSIS — D50.9 IRON DEFICIENCY ANEMIA, UNSPECIFIED IRON DEFICIENCY ANEMIA TYPE: ICD-10-CM

## 2023-01-30 DIAGNOSIS — Z30.41 ENCOUNTER FOR SURVEILLANCE OF CONTRACEPTIVE PILLS: ICD-10-CM

## 2023-01-30 LAB
% GRANULOCYTES: 58.8 % (ref 42.2–75.2)
HCT VFR BLD AUTO: 42.6 % (ref 35–46)
HEMOGLOBIN: 14.8 G/DL (ref 11.8–15.5)
LYMPHOCYTES NFR BLD AUTO: 32.6 % (ref 20.5–51.1)
MCH RBC QN AUTO: 29.7 PG (ref 27–31)
MCHC RBC AUTO-ENTMCNC: 34.9 G/DL (ref 33–37)
MCV RBC AUTO: 85.2 FL (ref 80–100)
MONOCYTES NFR BLD AUTO: 8.6 % (ref 1.7–9.3)
PLATELET # BLD AUTO: 241 K/UL (ref 140–450)
RBC # BLD AUTO: 5 X10/CMM (ref 3.7–5.2)
WBC # BLD AUTO: 5.6 X10/CMM (ref 3.8–11)

## 2023-01-30 PROCEDURE — 99214 OFFICE O/P EST MOD 30 MIN: CPT | Performed by: NURSE PRACTITIONER

## 2023-01-30 PROCEDURE — 36415 COLL VENOUS BLD VENIPUNCTURE: CPT | Performed by: NURSE PRACTITIONER

## 2023-01-30 PROCEDURE — 85025 COMPLETE CBC W/AUTO DIFF WBC: CPT | Performed by: NURSE PRACTITIONER

## 2023-01-30 RX ORDER — LISDEXAMFETAMINE DIMESYLATE 40 MG/1
40 CAPSULE ORAL DAILY
Qty: 30 CAPSULE | Refills: 0 | Status: SHIPPED | OUTPATIENT
Start: 2023-03-02 | End: 2023-04-01

## 2023-01-30 RX ORDER — LISDEXAMFETAMINE DIMESYLATE 40 MG/1
40 CAPSULE ORAL DAILY
Qty: 30 CAPSULE | Refills: 0 | Status: SHIPPED | OUTPATIENT
Start: 2023-04-02 | End: 2023-05-02

## 2023-01-30 RX ORDER — NORETHINDRONE ACETATE AND ETHINYL ESTRADIOL 1MG-20(21)
1 KIT ORAL DAILY
Qty: 84 TABLET | Refills: 4 | Status: SHIPPED | OUTPATIENT
Start: 2023-01-30 | End: 2024-04-19

## 2023-01-30 RX ORDER — LISDEXAMFETAMINE DIMESYLATE 40 MG/1
40 CAPSULE ORAL DAILY
Qty: 30 CAPSULE | Refills: 0 | Status: SHIPPED | OUTPATIENT
Start: 2023-01-30 | End: 2023-03-01

## 2023-01-30 ASSESSMENT — ANXIETY QUESTIONNAIRES
3. WORRYING TOO MUCH ABOUT DIFFERENT THINGS: NOT AT ALL
GAD7 TOTAL SCORE: 2
5. BEING SO RESTLESS THAT IT IS HARD TO SIT STILL: NOT AT ALL
1. FEELING NERVOUS, ANXIOUS, OR ON EDGE: SEVERAL DAYS
2. NOT BEING ABLE TO STOP OR CONTROL WORRYING: NOT AT ALL
IF YOU CHECKED OFF ANY PROBLEMS ON THIS QUESTIONNAIRE, HOW DIFFICULT HAVE THESE PROBLEMS MADE IT FOR YOU TO DO YOUR WORK, TAKE CARE OF THINGS AT HOME, OR GET ALONG WITH OTHER PEOPLE: SOMEWHAT DIFFICULT
6. BECOMING EASILY ANNOYED OR IRRITABLE: SEVERAL DAYS
GAD7 TOTAL SCORE: 2
7. FEELING AFRAID AS IF SOMETHING AWFUL MIGHT HAPPEN: NOT AT ALL
4. TROUBLE RELAXING: NOT AT ALL

## 2023-01-30 ASSESSMENT — PATIENT HEALTH QUESTIONNAIRE - PHQ9: SUM OF ALL RESPONSES TO PHQ QUESTIONS 1-9: 7

## 2023-01-30 NOTE — PROGRESS NOTES
"Problem(s) Oriented visit        SUBJECTIVE:                                                    Cara Payne is a 22 year old female who presents to clinic today for the following health issues :    Fatigue is bothersome to her. Not consistently taking iron supplement for anemia. Not taking other vitamins or supplements. Denies weight changes, bowel changes or temperature intolerance    Depression/anxiety - well controlled with sertraline 75 mg daily.   PHQ 4/6/2022 9/8/2022 1/30/2023   PHQ-9 Total Score - 3 7   Q9: Thoughts of better off dead/self-harm past 2 weeks Not at all Not at all Not at all      EVELIA-7 SCORE 4/6/2022 4/6/2022 1/30/2023   Total Score 3 3 2     Taking combination oral contraceptive. No new sexual partners.    Taking Vyvanse 40 mg daily for ADHD without appetite suppression or sleep difficulties.    Problem list, Medication list, Allergies, and Medical/Social/Surgical histories reviewed in EPIC and updated as appropriate.   Additional history: as documented    ROS:  7 point ROS completed and negative except noted above, including Gen, CV, Resp, GI, MS, Neuro, Psych    OBJECTIVE:                                                    /84   Pulse 98   Wt 75.8 kg (167 lb)   SpO2 98%   BMI 24.66 kg/m    Body mass index is 24.66 kg/m .   GENERAL: healthy, alert and no distress  RESP: calm regular breathing, no cough  CV: normal rate  NEURO: grossly intact  PSYCH: normal affect & mood     ASSESSMENT/PLAN:                                                      BMI:   Estimated body mass index is 24.66 kg/m  as calculated from the following:    Height as of 1/12/22: 1.753 m (5' 9\").    Weight as of this encounter: 75.8 kg (167 lb).         Cara was seen today for refill request and consult.    Diagnoses and all orders for this visit:    Attention deficit hyperactivity disorder (ADHD), unspecified ADHD type  -     lisdexamfetamine (VYVANSE) 40 MG capsule; Take 1 capsule (40 mg) by mouth " daily for 30 days  -     lisdexamfetamine (VYVANSE) 40 MG capsule; Take 1 capsule (40 mg) by mouth daily for 30 days  -     lisdexamfetamine (VYVANSE) 40 MG capsule; Take 1 capsule (40 mg) by mouth daily for 30 days  -     VENOUS COLLECTION  PDMP Review       Value Time User    State PDMP site checked  Yes 1/30/2023 11:17 AM Ciarra Hollis APRN CNP      Reviewed the status of her AD(H)D management status at length. she is satisfied with her current treatment including performance in school.  Discussed specific treatment including their possible side effects and the fact that they are controlled substances which require special handling of prescriptions and our need for close monitoring including regular follow up.    she desires to continue the current medication and agrees to the current schedule for visits every 6 month(s).  I reminded her that any noncompliance with regard to prescriptions or follow up may result in my declining to provide further refills of these medications.    Recurrent major depressive disorder, in full remission (H)  -     sertraline (ZOLOFT) 50 MG tablet; Take 1.5 tablets (75 mg) by mouth daily  -     VENOUS COLLECTION  Continue Sertraline without changes    Iron deficiency anemia, unspecified iron deficiency anemia type  -     CBC with Diff/Plt ([facility])  -     Ferritin  Serum ([facility])  -     Iron and TIBC ([facility])  -     VENOUS COLLECTION  Checking iron levels. Continue iron supplement     Encounter for surveillance of contraceptive pills  -     norethindrone-ethinyl estradiol (JUNEL FE 1/20) 1-20 MG-MCG tablet; Take 1 tablet by mouth daily  -     VENOUS COLLECTION  Taking consistently without side effects. Refilled    Fatigue, unspecified type  -     TSH ([facility])  -     Vitamin D  25-Hydroxy ([facility])  -     Vitamin B12 ([facility])  -     VENOUS COLLECTION  Checking labs      MANUELA Valerio CNP  [facility]  [phone]    For any issues my office # is [phone]

## 2023-01-31 LAB
FERRITIN SERPL-MCNC: 60 NG/ML (ref 15–150)
IRON BINDING CAP: 364 UG/DL (ref 250–450)
IRON SATURATION: 33 % (ref 15–55)
IRON: 121 UG/DL (ref 27–159)
TSH BLD-ACNC: 1.79 UIU/ML (ref 0.45–4.5)
UIBC: 243 UG/DL (ref 131–425)
VIT B12 SERPL-MCNC: 424 PG/ML (ref 232–1245)
VITAMIN D, 25-HYDROXY: 19.9 NG/ML (ref 30–100)

## 2023-03-16 ENCOUNTER — OFFICE VISIT (OUTPATIENT)
Dept: FAMILY MEDICINE | Facility: CLINIC | Age: 23
End: 2023-03-16

## 2023-03-16 VITALS
BODY MASS INDEX: 23.99 KG/M2 | WEIGHT: 162 LBS | SYSTOLIC BLOOD PRESSURE: 134 MMHG | RESPIRATION RATE: 16 BRPM | HEIGHT: 69 IN | TEMPERATURE: 97.3 F | DIASTOLIC BLOOD PRESSURE: 97 MMHG | OXYGEN SATURATION: 98 % | HEART RATE: 50 BPM

## 2023-03-16 DIAGNOSIS — R11.2 NAUSEA AND VOMITING, UNSPECIFIED VOMITING TYPE: Primary | ICD-10-CM

## 2023-03-16 PROCEDURE — 99213 OFFICE O/P EST LOW 20 MIN: CPT | Performed by: FAMILY MEDICINE

## 2023-03-16 RX ORDER — ONDANSETRON 4 MG/1
4 TABLET, ORALLY DISINTEGRATING ORAL EVERY 8 HOURS PRN
Qty: 24 TABLET | Refills: 1 | Status: SHIPPED | OUTPATIENT
Start: 2023-03-16 | End: 2024-04-05

## 2023-03-16 NOTE — PROGRESS NOTES
Just back from [place].  Diarrhea -     Onset: 3 day(s) ago    Description:   Consistency of stool: watery, Blood in stool: No  Number of loose stools in past 24 hours: 1    History:          Ill contacts: No        Recent use of antibiotics: No                 Recent medication-new or changes(Rx or OTC): No        Recent travels: YES- was in [place] for a week.         Have any tests/studies been done: none    Accompanying Signs & Symptoms:   Fever: YES  Nausea or vomiting; YES  Abdominal pain: YES- hurts throughout the stomach, vomiting   Episodes of constipation: No  Weight loss: No  Family history of Colon Cancer: No    Therapies tried: tylenol with minor relief    Able to get fluids in!      Problem(s) Oriented visit      ROS:  General and Resp. completed and negative except as noted above     HISTORY:   reports no history of alcohol use.   reports that she has never smoked. She has never used smokeless tobacco.    Past Medical History:   Diagnosis Date     ADHD (attention deficit hyperactivity disorder)      Anemia      Depression      Past Surgical History:   Procedure Laterality Date     LUMPECTOMY BREAST Left 06/2019    benign     wisdom teeth         EXAM:  BP: 134/97   Pulse: 50    Temp: 97.3    Wt Readings from Last 2 Encounters:   03/16/23 73.5 kg (162 lb)   01/30/23 75.8 kg (167 lb)       BMI= Body mass index is 23.92 kg/m .    EXAM:  APPEARANCE: = Relaxed and in no distress  Resp effort = Calm regular breathing  Heart Rate, Rythym, & sounds (no Murm)  = Regular rate and rythym with no S3, S4, or murmer appreciated.  Abdomen = Soft, nontender, no masses, & bowel sounds in all quadrants      Assessment/Plan:  Cara was seen today for diarrhea.    Diagnoses and all orders for this visit:    Nausea and vomiting, unspecified vomiting type  -     ondansetron (ZOFRAN ODT) 4 MG ODT tab; Take 1 tablet (4 mg) by mouth every 8 hours as needed for nausea    call if worsens.    COUNSELING:   reports that she has  "never smoked. She has never used smokeless tobacco.    Estimated body mass index is 23.92 kg/m  as calculated from the following:    Height as of this encounter: 1.753 m (5' 9\").    Weight as of this encounter: 73.5 kg (162 lb).       Appropriate preventive services were discussed with this patient, including applicable screening as appropriate for cardiovascular disease, diabetes, osteopenia/osteoporosis, and glaucoma.  As appropriate for age/gender, discussed screening for colorectal cancer, prostate cancer, breast cancer, and cervical cancer. Checklist reviewing preventive services available has been given to the patient.    Reviewed patients plan of care and provided an AVS. The Basic Care Plan (routine screening as documented in Health Maintenance) for Cara meets the Care Plan requirement. This Care Plan has been established and reviewed with the  Patient.      The following health maintenance items are reviewed in Epic and correct as of today:  Health Maintenance   Topic Date Due     YEARLY PREVENTIVE VISIT  01/12/2023     PHQ-9  07/30/2023     CHLAMYDIA SCREENING  08/09/2023     PAP  07/07/2025     ADVANCE CARE PLANNING  01/12/2027     DTAP/TDAP/TD IMMUNIZATION (8 - Td or Tdap) 04/06/2032     HEPATITIS C SCREENING  Completed     HIV SCREENING  Completed     DEPRESSION ACTION PLAN  Completed     INFLUENZA VACCINE  Completed     IPV IMMUNIZATION  Completed     HPV IMMUNIZATION  Completed     MENINGITIS IMMUNIZATION  Completed     HEPATITIS B IMMUNIZATION  Completed     COVID-19 Vaccine  Completed     Pneumococcal Vaccine: Pediatrics (0 to 5 Years) and At-Risk Patients (6 to 64 Years)  Aged Out       Nish Hernandez  [facility]  For any issues my office # is [phone]            "

## 2023-04-09 ENCOUNTER — HEALTH MAINTENANCE LETTER (OUTPATIENT)
Age: 23
End: 2023-04-09

## 2023-05-10 DIAGNOSIS — F90.9 ATTENTION DEFICIT HYPERACTIVITY DISORDER (ADHD), UNSPECIFIED ADHD TYPE: Chronic | ICD-10-CM

## 2023-05-10 RX ORDER — LISDEXAMFETAMINE DIMESYLATE 40 MG/1
40 CAPSULE ORAL EVERY MORNING
Qty: 30 CAPSULE | Refills: 0 | Status: SHIPPED | OUTPATIENT
Start: 2023-05-10 | End: 2023-06-09

## 2023-06-09 DIAGNOSIS — F90.9 ATTENTION DEFICIT HYPERACTIVITY DISORDER (ADHD), UNSPECIFIED ADHD TYPE: Chronic | ICD-10-CM

## 2023-06-09 RX ORDER — LISDEXAMFETAMINE DIMESYLATE 40 MG/1
CAPSULE ORAL
Qty: 30 CAPSULE | Refills: 0 | Status: SHIPPED | OUTPATIENT
Start: 2023-06-09 | End: 2023-07-05

## 2023-06-09 NOTE — TELEPHONE ENCOUNTER
SPOKE WITH PATIENT, MOVED APPT TO 6/30, SHE HAS 1 PILL LEFT AND HOPES YOU WILL NOT MAKE HER WAIT UNTIL SEEN

## 2023-06-09 NOTE — TELEPHONE ENCOUNTER
VYVANSE 40 MG capsule    lov 3/16/23-NOT RELATED  1/30/23-        4/6/2022     4:24 PM 9/8/2022     2:37 PM 1/30/2023    11:00 AM   PHQ   PHQ-9 Total Score  3 7   Q9: Thoughts of better off dead/self-harm past 2 weeks Not at all Not at all Not at all     Last notes  Taking Vyvanse 40 mg daily for ADHD without appetite suppression or sleep difficulties.  Reviewed the status of her AD(H)D management status at length. she is satisfied with her current treatment including performance in school.  Discussed specific treatment including their possible side effects and the fact that they are controlled substances which require special handling of prescriptions and our need for close monitoring including regular follow up.    she desires to continue the current medication and agrees to the current schedule for visits every 6 month(s).  I reminded her that any noncompliance with regard to prescriptions or follow up may result in my declining to provide further refills of these medications.     No future appt - due in July 2023- patient agrees

## 2023-07-05 ENCOUNTER — OFFICE VISIT (OUTPATIENT)
Dept: FAMILY MEDICINE | Facility: CLINIC | Age: 23
End: 2023-07-05

## 2023-07-05 VITALS
WEIGHT: 158 LBS | OXYGEN SATURATION: 100 % | SYSTOLIC BLOOD PRESSURE: 136 MMHG | DIASTOLIC BLOOD PRESSURE: 74 MMHG | BODY MASS INDEX: 23.33 KG/M2 | HEART RATE: 85 BPM

## 2023-07-05 DIAGNOSIS — B37.31 CANDIDIASIS OF VAGINA: ICD-10-CM

## 2023-07-05 DIAGNOSIS — N88.8 FRIABLE CERVIX: ICD-10-CM

## 2023-07-05 DIAGNOSIS — R39.9 LOWER URINARY TRACT SYMPTOMS (LUTS): ICD-10-CM

## 2023-07-05 DIAGNOSIS — B96.89 BV (BACTERIAL VAGINOSIS): ICD-10-CM

## 2023-07-05 DIAGNOSIS — R10.2 PELVIC PAIN IN FEMALE: ICD-10-CM

## 2023-07-05 DIAGNOSIS — N76.0 BV (BACTERIAL VAGINOSIS): ICD-10-CM

## 2023-07-05 DIAGNOSIS — F90.9 ATTENTION DEFICIT HYPERACTIVITY DISORDER (ADHD), UNSPECIFIED ADHD TYPE: ICD-10-CM

## 2023-07-05 DIAGNOSIS — N30.00 ACUTE CYSTITIS WITHOUT HEMATURIA: Primary | ICD-10-CM

## 2023-07-05 LAB
AMPHETAMINES (AMP) UR: NORMAL
BACTERIA (RMG): ABNORMAL
BARBITURATES (BAR) UR: NEGATIVE
BENZODIAZEPINES (BZO) UR: NEGATIVE
BILIRUBIN (RMG): NEGATIVE
BLOOD (RMG): ABNORMAL
BUPRENORPHINE (BUP) UR: NEGATIVE
CANNABINOIDS (THC) UR: NORMAL
CASTS (RMG): ABNORMAL
CLUE CELLS: POSITIVE
COCAINE (COC) UR: NEGATIVE
COLOR UR: ABNORMAL
CRYSTAL (RMG): ABNORMAL
EPITHELIAL (RMG): ABNORMAL
GLUCOSE (RMG): NEGATIVE
KETONE (RMG): ABNORMAL
LEUKOCYTE (RMG): ABNORMAL
MDMA UR: NEGATIVE
METHADONE (MTD) UR: NEGATIVE
METHAMPHETAMINE (METHA) UR: NEGATIVE
MORPH/OPIATES (MOP) UR: NEGATIVE
MUCOUS (RMG): ABNORMAL
NITRITE (RMG): POSITIVE
OXYCODONE (OXYCO) UR: NEGATIVE
PCP UR: NEGATIVE
PH UR STRIP: 7 PH (ref 5–7)
PROTEIN (RMG): NEGATIVE
RBC (RMG): ABNORMAL
SP GR UR STRIP: 1.02
SPECIMEN VALIDITY INTERNAL QC UR: NORMAL
SPECIMEN VALIDITY TEMPERATURE UR: NORMAL
SPECIMEN VALIDITY UR CREATININE: NORMAL MG/DL
SPECIMEN VALIDITY UR PH: 7
SPECIMEN VALIDITY UR SPECIFIC GRAVITY: 1.02
TRICHOMONAS (WET PREP): NEGATIVE
UROBILINOGEN (RMG): 0.2
WBC (RMG): ABNORMAL
YEAST (WET PREP): POSITIVE
YEAST: ABNORMAL

## 2023-07-05 PROCEDURE — 81003 URINALYSIS AUTO W/O SCOPE: CPT | Mod: QW

## 2023-07-05 PROCEDURE — 99214 OFFICE O/P EST MOD 30 MIN: CPT

## 2023-07-05 PROCEDURE — 80306 DRUG TEST PRSMV INSTRMNT: CPT

## 2023-07-05 PROCEDURE — 87210 SMEAR WET MOUNT SALINE/INK: CPT

## 2023-07-05 RX ORDER — LISDEXAMFETAMINE DIMESYLATE 40 MG/1
40 CAPSULE ORAL EVERY MORNING
Qty: 30 CAPSULE | Refills: 0 | Status: SHIPPED | OUTPATIENT
Start: 2023-09-06 | End: 2023-09-11

## 2023-07-05 RX ORDER — FLUCONAZOLE 150 MG/1
150 TABLET ORAL ONCE
Qty: 1 TABLET | Refills: 1 | Status: SHIPPED | OUTPATIENT
Start: 2023-07-05 | End: 2023-07-05

## 2023-07-05 RX ORDER — NITROFURANTOIN 25; 75 MG/1; MG/1
100 CAPSULE ORAL 2 TIMES DAILY
Qty: 10 CAPSULE | Refills: 0 | Status: SHIPPED | OUTPATIENT
Start: 2023-07-05 | End: 2023-07-10

## 2023-07-05 RX ORDER — LISDEXAMFETAMINE DIMESYLATE 40 MG/1
40 CAPSULE ORAL EVERY MORNING
Qty: 30 CAPSULE | Refills: 0 | Status: SHIPPED | OUTPATIENT
Start: 2023-08-05 | End: 2023-07-05

## 2023-07-05 RX ORDER — LISDEXAMFETAMINE DIMESYLATE 40 MG/1
40 CAPSULE ORAL EVERY MORNING
Qty: 30 CAPSULE | Refills: 0 | Status: SHIPPED | OUTPATIENT
Start: 2023-08-06 | End: 2023-07-05

## 2023-07-05 RX ORDER — METRONIDAZOLE 500 MG/1
500 TABLET ORAL 2 TIMES DAILY
Qty: 14 TABLET | Refills: 0 | Status: SHIPPED | OUTPATIENT
Start: 2023-07-05 | End: 2023-07-12

## 2023-07-05 RX ORDER — LISDEXAMFETAMINE DIMESYLATE 40 MG/1
40 CAPSULE ORAL EVERY MORNING
Qty: 30 CAPSULE | Refills: 0 | Status: SHIPPED | OUTPATIENT
Start: 2023-07-05 | End: 2023-07-05

## 2023-07-05 ASSESSMENT — PATIENT HEALTH QUESTIONNAIRE - PHQ9
SUM OF ALL RESPONSES TO PHQ QUESTIONS 1-9: 1
5. POOR APPETITE OR OVEREATING: NOT AT ALL

## 2023-07-05 ASSESSMENT — ANXIETY QUESTIONNAIRES
1. FEELING NERVOUS, ANXIOUS, OR ON EDGE: SEVERAL DAYS
6. BECOMING EASILY ANNOYED OR IRRITABLE: NOT AT ALL
3. WORRYING TOO MUCH ABOUT DIFFERENT THINGS: NOT AT ALL
IF YOU CHECKED OFF ANY PROBLEMS ON THIS QUESTIONNAIRE, HOW DIFFICULT HAVE THESE PROBLEMS MADE IT FOR YOU TO DO YOUR WORK, TAKE CARE OF THINGS AT HOME, OR GET ALONG WITH OTHER PEOPLE: SOMEWHAT DIFFICULT
7. FEELING AFRAID AS IF SOMETHING AWFUL MIGHT HAPPEN: NOT AT ALL
5. BEING SO RESTLESS THAT IT IS HARD TO SIT STILL: NOT AT ALL
GAD7 TOTAL SCORE: 1
GAD7 TOTAL SCORE: 1
2. NOT BEING ABLE TO STOP OR CONTROL WORRYING: NOT AT ALL

## 2023-07-05 NOTE — LETTER
[facility]  07/05/23  Patient: Cara Payne  YOB: 2000  Medical Record Number: [id number]                                                                                  Non-Opioid Controlled Substance Agreement    This is an agreement between you and your provider regarding safe and appropriate use of controlled substances prescribed by your care team. Controlled substances are?medicines that can cause physical and mental dependence (abuse).     There are strict laws about having and using these medicines. We here at [facility] are  committed to working with you in your efforts to get better. To support you in this work, we'll help you schedule regular office appointments for medicine refills. If we must cancel or change your appointment for any reason, we'll make sure you have enough medicine to last until your next appointment.     As a Provider, I will:   Listen carefully to your concerns while treating you with respect.   Recommend a treatment plan that I believe is in your best interest and may involve therapies other than medicine.    Talk with you often about the possible benefits and the risk of harm of any medicine that we prescribe for you.  Assess the safety of this medicine and check how well it works.    Provide a plan on how to taper (discontinue or go off) using this medicine if the decision is made to stop its use.      ::  As a Patient, I understand controlled substances:     Are prescribed by my care provider to help me function or work and manage my condition(s).?  Are strong medicines and can cause serious side effects.     Need to be taken exactly as prescribed.?Combining controlled substances with certain medicines or chemicals (such as illegal drugs, alcohol, sedatives, sleeping pills, and benzodiazepines) can be dangerous or even fatal.? If I stop taking my medicines suddenly, I may have severe withdrawal symptoms.     The risks, benefits, and side  effects of these medicine(s) were explained to me. I agree that:    I will take part in other treatments as advised by my care team. This may be psychiatry or counseling, physical therapy, behavioral therapy, group treatment or a referral to specialist.    I will keep all my appointments and understand this is part of the monitoring of controlled substances.?My care team may require an office visit for EVERY controlled substance refill. If I miss appointments or don t follow instructions, my care team may stop my medicine    I will take my medicines as prescribed. I will not change the dose or schedule unless my care team tells me to. There will be no refills if I run out early.      I may be asked to come to the clinic and complete a urine drug test or complete a pill count. If I don t give a urine sample or participate in a pill count, the care team may stop my medicine.    I will only receive controlled substance prescriptions from this clinic. If I am treated by another provider, I will tell them that I am taking controlled substances and that I have a treatment agreement with this provider. I will inform my [facility] care team within one business day if I am given a prescription for any controlled substance by another healthcare provider. My [facility] care team can contact other providers and pharmacists about my use of any medicines.    It is up to me to make sure that I don't run out of my medicines on weekends or holidays.?If my care team is willing to refill my prescription without a visit, I must request refills only during office hours. Refills may take up to 3 business days to process. I will use one pharmacy to fill all my controlled substance prescriptions. I will notify the clinic about any changes to my insurance or medicine availability.    I am responsible for my prescriptions. If the medicine/prescription is lost, stolen or destroyed, it will not be replaced.?I also agree not to  share controlled substance medicines with anyone.     I am aware I should not use any illegal or recreational drugs. I agree not to drink alcohol unless my care team says I can.     If I enroll in the [place] Medical Cannabis program, I will tell my care team before my next refill.    I will tell my care team right away if I become pregnant, have a new medical problem treated outside of my regular clinic, or have a change in my medicines.     I understand that this medicine can affect my thinking, judgment and reaction time.? Alcohol and drugs affect the brain and body, which can affect the safety of my driving. Being under the influence of alcohol or drugs can affect my decision-making, behaviors, personal safety and the safety of others. Driving while impaired (DWI) can occur if a person is driving, operating or in physical control of a car, motorcycle, boat, snowmobile, ATV, motorbike, off-road vehicle or any other motor vehicle ([place] Statute 169A.20). I understand the risk if I choose to drive or operate any vehicle or machinery.    I understand that if I do not follow any of the conditions above, my prescriptions or treatment may be stopped or changed.   I agree that my provider, clinic care team and pharmacy may work with any city, state or federal law enforcement agency that investigates the misuse, sale or other diversion of my controlled medicine. I will allow my provider to discuss my care with, or share a copy of, this agreement with any other treating provider, pharmacy or emergency room where I receive care.     I have read this agreement and have asked questions about anything I did not understand.    ________________________________________________________  Patient Signature - Cara Payne     ___________________                   Date     ________________________________________________________  Provider Signature - MANUELA Malik CNP       ___________________                   Date      ________________________________________________________  Witness Signature (required if provider not present while patient signing)          ___________________                   Date

## 2023-07-05 NOTE — PROGRESS NOTES
Assessment & Plan     Acute cystitis without hematuria  -UA positive for urinary tract infection - patient only have mild, intermittent suprapubic discomfort  -safe sex practices discussed - recommended patient go to [employer] as this a sex friendly store that can assist with sex health if she would like more guidance   - nitroFURantoin macrocrystal-monohydrate (MACROBID) 100 MG capsule  Dispense: 10 capsule; Refill: 0    Candidiasis of vagina  -Wet prep positive for yeast - will treat with diflucan - gave refill to use if feels yeast infection upon completion of antibiotics   - fluconazole (DIFLUCAN) 150 MG tablet  Dispense: 1 tablet; Refill: 1    Lower urinary tract symptoms (LUTS)  -UA positive will run culture and change treatment as needed per susceptibilities   - Urinalysis ([facility])  - Urine Culture  Routine ([facility])    Pelvic pain in female  -difficult CMT exam - patient states it was 'uncomfortable' but not painful  -patient to watch for worsening abdominal pain, fever, nausea/vomiting and go to ER   -positive for BV and yeast, see treatment above  -patient cannot recall last Pap and her cervix is rather inflamed today so I will perform Pap as well.  - Wet Prep ([facility])  - Chlamydia/GC Amplification ([facility])    Attention deficit hyperactivity disorder (ADHD), unspecified ADHD type  -NEGATIVE Uscreen toxisure  -refilled vyvanse - patient has been on this medication since she was 3 years old  -tolerating well - denies side effects  -will refill for 6 months given how long she has been on medication   - UScreen Toxisure ([facility])    BV (bacterial vaginosis)  -positve for clue cells on wet prep - will treat for BV today with metronidazole  Education about adverse effects of prescription medication discussed  -Patient verbalized understanding and is agreeable to the discussed plan of care.  -BV education discussed  -red flags that warrant emergent evaluation discussed  - metroNIDAZOLE (FLAGYL) 500 MG tablet   Dispense: 14 tablet; Refill: 0      Ordering of each unique test  Prescription drug management         See Patient Instructions    Return in about 3 days (around 7/8/2023), or if symptoms worsen or fail to improve, for Follow up.    LMP: 6/5/2023    MANUELA Malik CNP  [facility]    Santa Marroquin is a 22 year old, presenting for the following health issues:  Recheck Medication (Sertraline and Vyvanse. ) and Abdominal Pain (Pain in groin area/lower belly that started 2 days ago.  Doesn't feel like cramping but more like UTI sx.)    Patient visited boyfriend in [place] - last week - in monogamous relationship. Noticed pain when she woke up yesterday after she returned from [place].   Patient on oral birth control and forgot to take a dose - she did take the morning after pill while in [place].         HPI     Genitourinary - Female  Onset/Duration: 2 days  Description:   Painful urination (Dysuria): No           Frequency: No  Blood in urine (Hematuria): No  Delay in urine (Hesitency): No  Intensity: moderate  Progression of Symptoms:  same  Accompanying Signs & Symptoms:  Fever/chills: No  Flank pain: No  Nausea and vomiting: No  Vaginal symptoms: none  Abdominal/Pelvic Pain: YES  History:   History of frequent UTI s: No  History of kidney stones: No  Sexually Active: YES  Possibility of pregnancy: Don't Know  Precipitating or alleviating factors: None  Therapies tried and outcome: recently wa s visiting her boyfriend in [place] and then developed some mild abdominal pain a couple days ago, she returned from [place] yesterday, pain is not constant. Patient reports this is the first time she has ever let someone 'ejaculate' in her without wearing a condom  She feels safe in relationship - relationship is somewhat new  She did take the morning after pill 30 hours after having unprotected sex  Does not want to get pregnant at this time - taking oral birth control           Review of Systems    Constitutional, HEENT, cardiovascular, pulmonary, gi and gu systems are negative, except as otherwise noted.      Objective    /74   Pulse 85   Wt 71.7 kg (158 lb)   LMP 07/05/2023 (Exact Date)   SpO2 100%   BMI 23.33 kg/m    Body mass index is 23.33 kg/m .  Physical Exam   GENERAL: healthy, alert and no distress  NECK: no adenopathy, no asymmetry, masses, or scars and thyroid normal to palpation  RESP: lungs clear to auscultation - no rales, rhonchi or wheezes  CV: regular rate and rhythm, normal S1 S2, no S3 or S4, no murmur, click or rub, no peripheral edema and peripheral pulses strong  ABDOMEN: soft, nontender, no hepatosplenomegaly, no masses and bowel sounds normal   (female): normal female external genitalia, normal urethral meatus , vaginal mucosa pink, moist, well rugated, vaginal discharge - moderate, cervical discharge white, milky and cervical erosion/friability with injection, edema and erythema - no CMT - patient only states just feels 'uncomfortable' like the speculum exam  RECTAL (female): normal sphincter tone, no rectal masses  MS: no gross musculoskeletal defects noted, no edema  PSYCH: mentation appears normal, affect normal/bright  LYMPH: no cervical, supraclavicular, axillary, or inguinal adenopathy    Results for orders placed or performed in visit on 07/05/23 (from the past 24 hour(s))   Urinalysis ([facility])   Result Value Ref Range    Color Urine Mami (A)     pH Urine 7.0 pH    Specific Gravity Urine 1.020     PROTEIN ([facility]) Negative Negative    GLUCOSE ([facility]) Negative Negative    KETONE ([facility]) 3+ (A) Negative    LEUKOCYTE ([facility]) Trace (A) Negative    BLOOD ([facility]) Trace (A) Negative    Nitrite ([facility]) Positive (A) Negative, Positive    BILIRUBIN ([facility]) Negative Negative    UROBILINOGEN ([facility]) 0.2 0.2 - 1    WBC ([facility]) 3-5 (A)     RBC ([facility]) 0-3     CRYSTAL ([facility]) None     BACTERIA ([facility]) Many (A)     MUCOUS ([facility]) None     CASTS ([facility]) None     EPITHELIAL ([facility]) Few     Yeast Wet Prep preent  (A)    UScreen Toxisure ([facility])   Result Value Ref Range    Cannabinoids (THC) UR      Cocaine (IVONE) UR Negative     Morphine (MOP) UR Negative     PCP UR Negative     MDMA UR Negative     Amphetamines (AMP) UR Presumptive Positive     Methamphetamine (METHA) UR Negative     Barbiturates (BAR) UR Negative     Benzodiazepines (BZO) UR Negative     Methadone (MTD) UR Negative     Oxycodone (OXYCO) UR Negative     Buprenorphine (BUP) UR Negative     Specimen Validity UR Creatinine 20 mg/dL mg/dL    Specimen Validity UR pH 7.0     Specimen Validity UR Specific Gravity 1.025     Specimen Validity Temperature UR PASS     Specimen Validity Internal QC UR PASS    Wet Prep ([facility])   Result Value Ref Range    Clue cells Positive (A)     Yeast Wet Prep Positive (A)     Trichomonas Wet Prep Negative

## 2023-07-07 LAB
C TRACH DNA SPEC QL PROBE+SIG AMP: NEGATIVE
N GONORRHOEA DNA SPEC QL PROBE+SIG AMP: NEGATIVE

## 2023-07-09 LAB
ANTIMICROBIAL SUSCEPTIBILITY: ABNORMAL
Lab: ABNORMAL
URINE CULTURE: ABNORMAL

## 2023-07-10 LAB
.: NORMAL
DIAGNOSIS:: NORMAL
HPV APTIMA: NEGATIVE
HPV GENOTYPE REFLEX: NORMAL
Lab: NORMAL
Lab: NORMAL
PERFORMED BY:: NORMAL
SOURCE: NORMAL
SPECIMEN ADEQUACY:: NORMAL
TEST METHODOLOGY:: NORMAL

## 2023-07-28 ENCOUNTER — OFFICE VISIT (OUTPATIENT)
Dept: FAMILY MEDICINE | Facility: CLINIC | Age: 23
End: 2023-07-28

## 2023-07-28 VITALS
HEART RATE: 93 BPM | SYSTOLIC BLOOD PRESSURE: 112 MMHG | WEIGHT: 158 LBS | BODY MASS INDEX: 23.33 KG/M2 | DIASTOLIC BLOOD PRESSURE: 80 MMHG | OXYGEN SATURATION: 99 %

## 2023-07-28 DIAGNOSIS — R53.83 OTHER FATIGUE: ICD-10-CM

## 2023-07-28 DIAGNOSIS — Z87.440 RECENT URINARY TRACT INFECTION: ICD-10-CM

## 2023-07-28 DIAGNOSIS — B37.31 CANDIDIASIS OF VULVA AND VAGINA: ICD-10-CM

## 2023-07-28 DIAGNOSIS — N89.8 VAGINAL DISCHARGE: ICD-10-CM

## 2023-07-28 DIAGNOSIS — D50.9 IRON DEFICIENCY ANEMIA, UNSPECIFIED IRON DEFICIENCY ANEMIA TYPE: ICD-10-CM

## 2023-07-28 DIAGNOSIS — B35.4 TINEA CORPORIS: ICD-10-CM

## 2023-07-28 DIAGNOSIS — Z11.3 SCREEN FOR STD (SEXUALLY TRANSMITTED DISEASE): Primary | ICD-10-CM

## 2023-07-28 LAB
% GRANULOCYTES: 59 % (ref 42.2–75.2)
ANION GAP SERPL CALCULATED.3IONS-SCNC: 11 MMOL/L (ref 7–15)
BACTERIA (RMG): ABNORMAL
BILIRUBIN (RMG): NEGATIVE
BLOOD (RMG): ABNORMAL
BUN SERPL-MCNC: 11.8 MG/DL (ref 6–20)
CALCIUM SERPL-MCNC: 9.4 MG/DL (ref 8.6–10)
CASTS (RMG): ABNORMAL
CHLORIDE SERPL-SCNC: 102 MMOL/L (ref 98–107)
CLUE CELLS: NEGATIVE
COLOR UR: YELLOW
CREAT SERPL-MCNC: 0.61 MG/DL (ref 0.51–0.95)
CRYSTAL (RMG): ABNORMAL
DEPRECATED HCO3 PLAS-SCNC: 25 MMOL/L (ref 22–29)
EPITHELIAL (RMG): ABNORMAL
GFR SERPL CREATININE-BSD FRML MDRD: >90 ML/MIN/1.73M2
GLUCOSE (RMG): NEGATIVE
GLUCOSE SERPL-MCNC: 82 MG/DL (ref 70–99)
HCT VFR BLD AUTO: 44.1 % (ref 35–46)
HEMOGLOBIN: 14.8 G/DL (ref 11.8–15.5)
KETONE (RMG): NEGATIVE
LEUKOCYTE (RMG): ABNORMAL
LYMPHOCYTES NFR BLD AUTO: 33.4 % (ref 20.5–51.1)
MCH RBC QN AUTO: 29.3 PG (ref 27–31)
MCHC RBC AUTO-ENTMCNC: 33.5 G/DL (ref 33–37)
MCV RBC AUTO: 87.5 FL (ref 80–100)
MONOCYTES NFR BLD AUTO: 7.6 % (ref 1.7–9.3)
MUCOUS (RMG): ABNORMAL
NITRITE (RMG): NEGATIVE
PH UR STRIP: 7 PH (ref 5–7)
PLATELET # BLD AUTO: 313 K/UL (ref 140–450)
POTASSIUM SERPL-SCNC: 4.5 MMOL/L (ref 3.4–5.3)
PROTEIN (RMG): NEGATIVE
RBC # BLD AUTO: 5.04 X10/CMM (ref 3.7–5.2)
RBC (RMG): ABNORMAL
SODIUM SERPL-SCNC: 138 MMOL/L (ref 136–145)
SP GR UR STRIP: 1.02
TRICHOMONAS (WET PREP): NEGATIVE
UROBILINOGEN (RMG): 1
WBC # BLD AUTO: 7.2 X10/CMM (ref 3.8–11)
WBC (RMG): ABNORMAL
YEAST (WET PREP): POSITIVE

## 2023-07-28 PROCEDURE — 86803 HEPATITIS C AB TEST: CPT | Mod: ORL

## 2023-07-28 PROCEDURE — 99214 OFFICE O/P EST MOD 30 MIN: CPT

## 2023-07-28 PROCEDURE — 36415 COLL VENOUS BLD VENIPUNCTURE: CPT

## 2023-07-28 PROCEDURE — 87086 URINE CULTURE/COLONY COUNT: CPT | Mod: ORL

## 2023-07-28 PROCEDURE — 87389 HIV-1 AG W/HIV-1&-2 AB AG IA: CPT | Mod: ORL

## 2023-07-28 PROCEDURE — 80048 BASIC METABOLIC PNL TOTAL CA: CPT | Mod: ORL

## 2023-07-28 PROCEDURE — 81003 URINALYSIS AUTO W/O SCOPE: CPT

## 2023-07-28 PROCEDURE — 86780 TREPONEMA PALLIDUM: CPT | Mod: ORL

## 2023-07-28 PROCEDURE — 87210 SMEAR WET MOUNT SALINE/INK: CPT

## 2023-07-28 PROCEDURE — 85025 COMPLETE CBC W/AUTO DIFF WBC: CPT

## 2023-07-28 RX ORDER — KETOCONAZOLE 20 MG/G
CREAM TOPICAL DAILY
Qty: 30 G | Refills: 1 | Status: SHIPPED | OUTPATIENT
Start: 2023-07-28 | End: 2023-08-11

## 2023-07-28 RX ORDER — FLUCONAZOLE 150 MG/1
150 TABLET ORAL ONCE
Qty: 1 TABLET | Refills: 1 | Status: SHIPPED | OUTPATIENT
Start: 2023-07-28 | End: 2023-07-28

## 2023-07-28 NOTE — PROGRESS NOTES
Assessment & Plan     Screen for STD (sexually transmitted disease)  -patient had new sex partner last visit and we did not do blood work, will complete STD screening blood work today, no new concerns  - Treponema Abs w Reflex to RPR and Titer  - HIV Antigen Antibody Combo Cascade  - Hepatitis C Screen Reflex to HCV RNA Quant and Genotype  - Treponema Abs w Reflex to RPR and Titer  - HIV Antigen Antibody Combo Cascade  - Hepatitis C Screen Reflex to HCV RNA Quant and Genotype    Other fatigue  -will check baseline labs, TSH was stable a few months ago   - CBC with Diff/Plt ([facility])  - Basic metabolic panel  - Basic metabolic panel    Iron deficiency anemia, unspecified iron deficiency anemia type  -history of iron deficiency, not taking iron supplement, will check CBC  - CBC with Diff/Plt ([facility])    Vaginal discharge  -see candidiasis plan  - Wet Prep ([facility])    Recent urinary tract infection  -patient did not have many symptoms for her last UTI about 1 month ago, will recheck given new symptoms today and having fatigue as well  - Urinalysis ([facility])  - Urine Culture    Tinea corporis  -tinea of chest, patient to treat with ketoconazole  -Education about adverse effects of prescription medication discussed  -Patient verbalized understanding and is agreeable to the discussed plan of care.  -patient to apply cream until rash is gone and continue for 1 week after resolution then stop  - ketoconazole (NIZORAL) 2 % external cream  Dispense: 30 g; Refill: 1    Candidiasis of vulva and vagina  -POSITIVE for yeast on wet prep, will treat with ketoconazole, no concerns for pregnancy  -Education about adverse effects of prescription medication discussed  -Patient verbalized understanding and is agreeable to the discussed plan of care.  -Red flags that warrant emergent evaluation discussed  - fluconazole (DIFLUCAN) 150 MG tablet  Dispense: 1 tablet; Refill: 1      Ordering of each unique test  Prescription drug management         See  Patient Instructions    Return in about 3 months (around 10/28/2023), or if symptoms worsen or fail to improve, for Follow up.    MANUELA Malik CNP  [facility]    Santa Marroquin is a 22 year old, presenting for the following health issues:  Derm Problem (Having breakouts on chest within the past 6 months.) and Vaginal Problem (Possibly yeast infection from nitrofurantoin she completed for uti.)    HPI     Acute Illness  Acute illness concerns: breaking out with non-cystic acne on chest - waxing and waning over the past 6 months  Using benzoyl peroxide topically for the past 2 weeks with moderate improvement         complaint:  Patient also has noticed mild amount of clear vaginal discharge over the past couple days - denies dysuria, frequency, urgency, flank pain, nausea/vomting, body aches, chills,  No new recent sex partners and no concern for pregnancy   -concern for thin, clear discharge with blood in it  -has had some inconsistency with taking her birth control   -5 days   Due for menstrual period in 1 week     Review of Systems   CONSTITUTIONAL: NEGATIVE for fever, chills, change in weight  ENT/MOUTH: NEGATIVE for ear, mouth and throat problems  RESP: NEGATIVE for significant cough or SOB  CV: NEGATIVE for chest pain, palpitations or peripheral edema  : normal menstrual cycles and vaginal discharge      Objective    /80   Pulse 93   Wt 71.7 kg (158 lb)   LMP 07/05/2023 (Exact Date)   SpO2 99%   BMI 23.33 kg/m    Body mass index is 23.33 kg/m .  Physical Exam   GENERAL: healthy, alert and no distress  NECK: no adenopathy, no asymmetry, masses, or scars and thyroid normal to palpation  RESP: lungs clear to auscultation - no rales, rhonchi or wheezes  CV: regular rate and rhythm, normal S1 S2, no S3 or S4, no murmur, click or rub, no peripheral edema and peripheral pulses strong  ABDOMEN: soft, nontender, no hepatosplenomegaly, no masses and bowel sounds normal    (female): deferred, had work up 1 month ago, self swab preferred by patient today  MS: no gross musculoskeletal defects noted, no edema  SKIN: faintly tan macule rash with sanjuana edges to chest

## 2023-07-29 LAB
HCV AB SERPL QL IA: NONREACTIVE
HIV 1+2 AB+HIV1 P24 AG SERPL QL IA: NONREACTIVE
T PALLIDUM AB SER QL: NONREACTIVE

## 2023-07-30 LAB — BACTERIA UR CULT: NORMAL

## 2023-09-13 ENCOUNTER — MYC MEDICAL ADVICE (OUTPATIENT)
Dept: FAMILY MEDICINE | Facility: CLINIC | Age: 23
End: 2023-09-13

## 2023-09-13 DIAGNOSIS — B37.31 CANDIDIASIS OF VULVA AND VAGINA: Primary | ICD-10-CM

## 2023-09-13 RX ORDER — FLUCONAZOLE 150 MG/1
150 TABLET ORAL ONCE
Qty: 1 TABLET | Refills: 1 | Status: SHIPPED | OUTPATIENT
Start: 2023-09-13 | End: 2023-09-13

## 2023-09-15 ENCOUNTER — TELEPHONE (OUTPATIENT)
Dept: FAMILY MEDICINE | Facility: CLINIC | Age: 23
End: 2023-09-15

## 2023-09-15 DIAGNOSIS — F90.9 ATTENTION DEFICIT HYPERACTIVITY DISORDER (ADHD), UNSPECIFIED ADHD TYPE: ICD-10-CM

## 2023-09-15 RX ORDER — LISDEXAMFETAMINE DIMESYLATE 40 MG/1
40 CAPSULE ORAL EVERY MORNING
Qty: 30 CAPSULE | Refills: 0 | Status: SHIPPED | OUTPATIENT
Start: 2023-09-15 | End: 2023-12-11

## 2023-09-15 NOTE — TELEPHONE ENCOUNTER
Patient called clinic requesting that Vyvanse 40mg cap be sent to alternative pharmacy due to supply issues. Patient confirmed that [facility] pharmacy in [place] has stock available. Prescription re-sent to Kimberlee Paula CNP for review. Abena Jean

## 2023-09-19 ENCOUNTER — E-VISIT (OUTPATIENT)
Dept: URGENT CARE | Facility: CLINIC | Age: 23
End: 2023-09-19
Payer: COMMERCIAL

## 2023-09-19 DIAGNOSIS — Z53.9 DIAGNOSIS NOT YET DEFINED: Primary | ICD-10-CM

## 2023-09-20 NOTE — PATIENT INSTRUCTIONS
Hi-you did an evisit for COVID. I think you meant to do a visit with your PCP Kimberlee Paula. I would direct your concerns to your primary tomorrow.    Thanks    Dr. Manolo Cordoba

## 2023-11-03 DIAGNOSIS — F90.9 ATTENTION DEFICIT HYPERACTIVITY DISORDER (ADHD), UNSPECIFIED ADHD TYPE: ICD-10-CM

## 2023-11-04 NOTE — CONFIDENTIAL NOTE
CONTROLLED SUBSTANCE REFILL REQUEST FOR VYVANSE  DOSE: 40 MG - # 30  SI CAP EVERY MORNING      LAST REFILL: 10/17/23    LAST APPT RELATED: 23    NEXT APPT: NONE      CONTROLLED SUBSTANCE AGREEMENT: 23    URINE DRUG SCREEN: 23        FILL HISTORY PER :          REFILL ROUTED TO DELVIS CHACON FOR REVIEW    Cathi Irizarry, [facility]

## 2023-11-06 RX ORDER — LISDEXAMFETAMINE DIMESYLATE 40 MG/1
40 CAPSULE ORAL DAILY
Qty: 30 CAPSULE | Refills: 0 | Status: SHIPPED | OUTPATIENT
Start: 2024-01-15 | End: 2024-02-14

## 2023-11-06 RX ORDER — LISDEXAMFETAMINE DIMESYLATE 40 MG/1
40 CAPSULE ORAL DAILY
Qty: 30 CAPSULE | Refills: 0 | Status: SHIPPED | OUTPATIENT
Start: 2023-11-16 | End: 2023-12-11

## 2023-11-06 RX ORDER — LISDEXAMFETAMINE DIMESYLATE 40 MG/1
40 CAPSULE ORAL DAILY
Qty: 30 CAPSULE | Refills: 0 | Status: SHIPPED | OUTPATIENT
Start: 2023-12-16 | End: 2023-12-11

## 2023-11-16 ENCOUNTER — MYC REFILL (OUTPATIENT)
Dept: FAMILY MEDICINE | Facility: CLINIC | Age: 23
End: 2023-11-16

## 2023-11-16 DIAGNOSIS — F90.9 ATTENTION DEFICIT HYPERACTIVITY DISORDER (ADHD), UNSPECIFIED ADHD TYPE: ICD-10-CM

## 2023-11-16 RX ORDER — LISDEXAMFETAMINE DIMESYLATE 40 MG/1
40 CAPSULE ORAL DAILY
Qty: 30 CAPSULE | Refills: 0 | Status: CANCELLED | OUTPATIENT
Start: 2023-11-16

## 2023-11-16 NOTE — TELEPHONE ENCOUNTER
Patient sent [employer] refill request for Vyvanse 40mg QD.     Last related visit: 7/5/23 with Kimberlee Paula NP     On 11/6/23 Kimberlee Paula NP sent 3 Vyvanse prescriptions to [facility] on [address] in [place] with start dates of 11/16/23, 12/16/23 and 1/15/24.   Patient has not yet filled any of these.      Recent fill history per [place] :       Plan: messaged patient that there are 3 valid prescriptions at her pharmacy and to call [facility] and speak with a person to request one of these be filled each month. Due for med check before any future fills needed ~2/2024.

## 2023-12-11 ENCOUNTER — OFFICE VISIT (OUTPATIENT)
Dept: FAMILY MEDICINE | Facility: CLINIC | Age: 23
End: 2023-12-11

## 2023-12-11 VITALS
SYSTOLIC BLOOD PRESSURE: 133 MMHG | DIASTOLIC BLOOD PRESSURE: 88 MMHG | WEIGHT: 160 LBS | OXYGEN SATURATION: 99 % | HEART RATE: 89 BPM | BODY MASS INDEX: 23.63 KG/M2

## 2023-12-11 DIAGNOSIS — N90.89 VULVAR IRRITATION: ICD-10-CM

## 2023-12-11 DIAGNOSIS — Z23 NEED FOR INFLUENZA VACCINATION: ICD-10-CM

## 2023-12-11 DIAGNOSIS — R39.9 LOWER URINARY TRACT SYMPTOMS: Primary | ICD-10-CM

## 2023-12-11 LAB
BACTERIA (RMG): ABNORMAL
BILIRUBIN (RMG): ABNORMAL
BLOOD (RMG): NEGATIVE
CASTS (RMG): ABNORMAL
CLUE CELLS: NEGATIVE
COLOR UR: ABNORMAL
CRYSTAL (RMG): ABNORMAL
EPITHELIAL (RMG): ABNORMAL
GLUCOSE (RMG): NEGATIVE
HCG UR QL: NEGATIVE
KETONE (RMG): NEGATIVE
LEUKOCYTE (RMG): ABNORMAL
MUCOUS (RMG): ABNORMAL
NITRITE (RMG): NEGATIVE
PH UR STRIP: 6 PH (ref 5–7)
PROTEIN (RMG): NEGATIVE
RBC (RMG): ABNORMAL
SP GR UR STRIP: 1.02
TRICHOMONAS (WET PREP): NEGATIVE
UROBILINOGEN (RMG): 1
WBC (RMG): ABNORMAL
YEAST (WET PREP): NEGATIVE

## 2023-12-11 PROCEDURE — 87210 SMEAR WET MOUNT SALINE/INK: CPT

## 2023-12-11 PROCEDURE — 81003 URINALYSIS AUTO W/O SCOPE: CPT

## 2023-12-11 PROCEDURE — 99213 OFFICE O/P EST LOW 20 MIN: CPT | Mod: 25

## 2023-12-11 PROCEDURE — 84703 CHORIONIC GONADOTROPIN ASSAY: CPT

## 2023-12-11 PROCEDURE — 87086 URINE CULTURE/COLONY COUNT: CPT | Mod: ORL

## 2023-12-11 PROCEDURE — 90674 CCIIV4 VAC NO PRSV 0.5 ML IM: CPT

## 2023-12-11 PROCEDURE — 90471 IMMUNIZATION ADMIN: CPT | Mod: 59

## 2023-12-11 NOTE — PROGRESS NOTES
Assessment & Plan     Lower urinary tract symptoms  -all labs negative, could be r/t stress and vulvar irritation from sex  -vulvar health discussed  -Red flags that warrant emergent evaluation discussed  -Patient verbalized understanding and is agreeable to the discussed plan of care.  -if urine culture positive will treat based on susceptibilities   - Urinalysis ([facility])  - Wet Prep ([facility])  - HCG urine ([facility])  - Urine Culture  - Urine Culture    Need for influenza vaccination  - VACCINE ADMINISTRATION, INITIAL    Vulvar irritation  -see plan above        Return in about 3 days (around 12/14/2023), or if symptoms worsen or fail to improve, for Follow up.    MANUELA Malik [facility]    Santa Marroquin is a 23 year old, presenting for the following health issues:  LUTS (Pain with urination, some back pain/Denies fever/Has had UTIs before, but does not have a lot of symptoms usually)    HPI     Cloudy urine, dysuria last night   New sex partner - used protection     Genitourinary - Female  Onset/Duration: 3 days  Description:   Painful urination (Dysuria): YES           Frequency: No  Blood in urine (Hematuria): No  Delay in urine (Hesitency): No  Intensity: moderate  Progression of Symptoms:  same  Accompanying Signs & Symptoms:  Fever/chills: No  Flank pain: Mild  Nausea and vomiting: No  Vaginal symptoms: none  Abdominal/Pelvic Pain: No  History:   History of frequent UTI s: YES  History of kidney stones: No  Sexually Active: YES  Possibility of pregnancy: No  Precipitating or alleviating factors: None  Therapies tried and outcome:  increased hydration         Review of Systems   Constitutional, HEENT, cardiovascular, pulmonary, gi and gu systems are negative, except as otherwise noted.      Objective    /88   Pulse 89   Wt 72.6 kg (160 lb)   SpO2 99%   BMI 23.63 kg/m    Body mass index is 23.63 kg/m .  Physical Exam   GENERAL: healthy, alert and no distress  NECK: no adenopathy,  no asymmetry, masses, or scars and thyroid normal to palpation  RESP: lungs clear to auscultation - no rales, rhonchi or wheezes  CV: regular rate and rhythm, normal S1 S2, no S3 or S4, no murmur, click or rub, no peripheral edema and peripheral pulses strong  ABDOMEN: soft, nontender, no hepatosplenomegaly, no masses and bowel sounds normal  MS: no gross musculoskeletal defects noted, no edema    Results for orders placed or performed in visit on 12/11/23 (from the past 24 hour(s))   Wet Prep ([facility])   Result Value Ref Range    Clue cells Negative     Yeast Wet Prep Negative     Trichomonas Wet Prep Negative    HCG urine ([facility])   Result Value Ref Range    HCG Qual Urine Negative Negative   Urinalysis ([facility])   Result Value Ref Range    Color Urine Mami     pH Urine 6.0 pH    Specific Gravity Urine 1.020     PROTEIN ([facility]) Negative Negative    GLUCOSE ([facility]) Negative Negative    KETONE ([facility]) Negative Negative    LEUKOCYTE ([facility]) Trace (A) Negative    BLOOD ([facility]) Negative Negative    Nitrite ([facility]) Negative Negative, Positive    BILIRUBIN ([facility]) 1+ (A) Negative    UROBILINOGEN ([facility]) 1.0 0.2 - 1    WBC ([facility]) 0-3     RBC ([facility]) Rare     CRYSTAL ([facility]) None     BACTERIA ([facility]) Few     MUCOUS ([facility]) None     CASTS ([facility]) None     EPITHELIAL ([facility]) Few      Patient elected to do self swab given no abnormal vaginal or abdominal symptoms

## 2023-12-13 LAB — BACTERIA UR CULT: NORMAL

## 2024-01-05 ENCOUNTER — OFFICE VISIT (OUTPATIENT)
Dept: URGENT CARE | Facility: URGENT CARE | Age: 24
End: 2024-01-05
Payer: COMMERCIAL

## 2024-01-05 VITALS
TEMPERATURE: 97 F | OXYGEN SATURATION: 97 % | DIASTOLIC BLOOD PRESSURE: 81 MMHG | HEART RATE: 97 BPM | SYSTOLIC BLOOD PRESSURE: 130 MMHG

## 2024-01-05 DIAGNOSIS — J02.0 STREP PHARYNGITIS: Primary | ICD-10-CM

## 2024-01-05 DIAGNOSIS — R07.0 THROAT PAIN: ICD-10-CM

## 2024-01-05 LAB — DEPRECATED S PYO AG THROAT QL EIA: POSITIVE

## 2024-01-05 PROCEDURE — 99213 OFFICE O/P EST LOW 20 MIN: CPT | Performed by: PHYSICIAN ASSISTANT

## 2024-01-05 PROCEDURE — 87880 STREP A ASSAY W/OPTIC: CPT | Performed by: PHYSICIAN ASSISTANT

## 2024-01-05 RX ORDER — CEPHALEXIN 500 MG/1
500 CAPSULE ORAL 2 TIMES DAILY
Qty: 20 CAPSULE | Refills: 0 | Status: SHIPPED | OUTPATIENT
Start: 2024-01-05 | End: 2024-01-15

## 2024-01-05 NOTE — PROGRESS NOTES
Chief Complaint   Patient presents with    Pharyngitis     Intermittent since Sunday. Pt states being around another person with positive strep on Sat.       ASSESSMENT/PLAN:  Cara was seen today for pharyngitis.    Diagnoses and all orders for this visit:    Strep pharyngitis  -     cephALEXin (KEFLEX) 500 MG capsule; Take 1 capsule (500 mg) by mouth 2 times daily for 10 days    Throat pain  -     Streptococcus A Rapid Screen w/Reflex to PCR - Clinic Collect    Strep positive.  Start on Keflex due to penicillin allergy    Sandor Cabello PA-C      SUBJECTIVE:  Cara is a 23 year old female who presents to urgent care with sore throat for 4 to 5 days.  She share drinks with someone that tested positive for strep.    ROS: Pertinent ROS neg other than the symptoms noted above in the HPI.     OBJECTIVE:  /81   Pulse 97   Temp 97  F (36.1  C) (Tympanic)   SpO2 97%    GENERAL: healthy, alert and no distress  EYES: Eyes grossly normal to inspection, PERRL and conjunctivae and sclerae normal  HENT: Oropharynx erythema, tonsils 1+ bilaterally nose and mouth without ulcers or lesions  RESP: lungs clear to auscultation - no rales, rhonchi or wheezes  CV: regular rate and rhythm, normal S1 S2, no S3 or S4, no murmur, click or rub    DIAGNOSTICS    Results for orders placed or performed in visit on 01/05/24   Streptococcus A Rapid Screen w/Reflex to PCR - Clinic Collect     Status: Abnormal    Specimen: Throat; Swab   Result Value Ref Range    Group A Strep antigen Positive (A) Negative        Current Outpatient Medications   Medication    albuterol (PROAIR HFA/PROVENTIL HFA/VENTOLIN HFA) 108 (90 Base) MCG/ACT inhaler    ferrous sulfate (FEROSUL) 325 (65 Fe) MG tablet    [START ON 1/15/2024] lisdexamfetamine (VYVANSE) 40 MG capsule    norethindrone-ethinyl estradiol (JUNEL FE 1/20) 1-20 MG-MCG tablet    ondansetron (ZOFRAN ODT) 4 MG ODT tab    sertraline (ZOLOFT) 50 MG tablet     No current  facility-administered medications for this visit.      Patient Active Problem List   Diagnosis    Recurrent major depressive disorder, in full remission (H24)    Iron deficiency anemia, unspecified iron deficiency anemia type    Attention deficit hyperactivity disorder (ADHD), unspecified ADHD type    History of eating disorder    Controlled substance agreement signed      Past Medical History:   Diagnosis Date    ADHD (attention deficit hyperactivity disorder)     Anemia     Depression      Past Surgical History:   Procedure Laterality Date    LUMPECTOMY BREAST Left 06/2019    benign    wisdom teeth       Family History   Problem Relation Age of Onset    Depression Father     Breast Cancer Maternal Grandmother 89    Pancreatic Cancer Maternal Grandmother     Heart Disease Maternal Grandfather     Breast Cancer Maternal Aunt     Depression Paternal Aunt     Substance Abuse Paternal Aunt      Social History     Tobacco Use    Smoking status: Never    Smokeless tobacco: Never   Substance Use Topics    Alcohol use: No              The plan of care was discussed with the patient. They understand and agree with the course of treatment prescribed. A printed summary was given including instructions and medications.  The use of Dragon/[facility] dictation services may have been used to construct the content in this note; any grammatical or spelling errors are non-intentional. Please contact the author of this note directly if you are in need of any clarification.

## 2024-02-14 ENCOUNTER — MYC REFILL (OUTPATIENT)
Dept: FAMILY MEDICINE | Facility: CLINIC | Age: 24
End: 2024-02-14

## 2024-02-14 DIAGNOSIS — F90.9 ATTENTION DEFICIT HYPERACTIVITY DISORDER (ADHD), UNSPECIFIED ADHD TYPE: ICD-10-CM

## 2024-02-14 RX ORDER — LISDEXAMFETAMINE DIMESYLATE 40 MG/1
40 CAPSULE ORAL DAILY
Qty: 30 CAPSULE | Refills: 0 | Status: CANCELLED | OUTPATIENT
Start: 2024-02-14

## 2024-02-14 NOTE — TELEPHONE ENCOUNTER
Patient sent MyChart message and left message on nurse's voicemail request requesting a refill on Vyvanse 40mg. States planned poorly and only has 2 doses left so needs refill expedited.    Last related visit: 7/5/23 with Kimberlee Paula NP - 6 months of rx's were sent then.   Had appt scheduled for 1/25/24 but cancelled this.   No future appts scheduled.     Called patient back and offered appt today. Patient states her pharmacy did find a prescription on file from this past summer that they are filling for her so doesn't need today. Patient scheduled med check for 2/23/24 with Kimberlee Diamond RN

## 2024-03-01 ENCOUNTER — OFFICE VISIT (OUTPATIENT)
Dept: FAMILY MEDICINE | Facility: CLINIC | Age: 24
End: 2024-03-01

## 2024-03-01 VITALS
BODY MASS INDEX: 24.22 KG/M2 | HEART RATE: 82 BPM | OXYGEN SATURATION: 97 % | SYSTOLIC BLOOD PRESSURE: 127 MMHG | WEIGHT: 164 LBS | DIASTOLIC BLOOD PRESSURE: 77 MMHG

## 2024-03-01 DIAGNOSIS — B37.31 YEAST INFECTION OF THE VAGINA: ICD-10-CM

## 2024-03-01 DIAGNOSIS — F90.9 ATTENTION DEFICIT HYPERACTIVITY DISORDER (ADHD), UNSPECIFIED ADHD TYPE: Primary | Chronic | ICD-10-CM

## 2024-03-01 DIAGNOSIS — N89.8 VAGINAL ITCHING: ICD-10-CM

## 2024-03-01 DIAGNOSIS — N89.8 VAGINAL DISCHARGE: ICD-10-CM

## 2024-03-01 DIAGNOSIS — B99.9 RECURRENT INFECTIONS: ICD-10-CM

## 2024-03-01 DIAGNOSIS — A49.3 MYCOPLASMA INFECTION: ICD-10-CM

## 2024-03-01 LAB
ANION GAP SERPL CALCULATED.3IONS-SCNC: 13 MMOL/L (ref 7–15)
BUN SERPL-MCNC: 12 MG/DL (ref 6–20)
CALCIUM SERPL-MCNC: 9.7 MG/DL (ref 8.6–10)
CHLORIDE SERPL-SCNC: 103 MMOL/L (ref 98–107)
CLUE CELLS: NEGATIVE
CREAT SERPL-MCNC: 0.73 MG/DL (ref 0.51–0.95)
DEPRECATED HCO3 PLAS-SCNC: 21 MMOL/L (ref 22–29)
EGFRCR SERPLBLD CKD-EPI 2021: >90 ML/MIN/1.73M2
GLUCOSE SERPL-MCNC: 85 MG/DL (ref 70–99)
HBA1C MFR BLD: 4.9 %
POTASSIUM SERPL-SCNC: 4.2 MMOL/L (ref 3.4–5.3)
SODIUM SERPL-SCNC: 137 MMOL/L (ref 135–145)
TRICHOMONAS (WET PREP): NEGATIVE
YEAST (WET PREP): POSITIVE

## 2024-03-01 PROCEDURE — 36415 COLL VENOUS BLD VENIPUNCTURE: CPT

## 2024-03-01 PROCEDURE — 83036 HEMOGLOBIN GLYCOSYLATED A1C: CPT | Mod: ORL

## 2024-03-01 PROCEDURE — 87798 DETECT AGENT NOS DNA AMP: CPT

## 2024-03-01 PROCEDURE — 80048 BASIC METABOLIC PNL TOTAL CA: CPT | Mod: ORL

## 2024-03-01 PROCEDURE — 87563 M. GENITALIUM AMP PROBE: CPT

## 2024-03-01 PROCEDURE — 99214 OFFICE O/P EST MOD 30 MIN: CPT

## 2024-03-01 PROCEDURE — 87210 SMEAR WET MOUNT SALINE/INK: CPT

## 2024-03-01 RX ORDER — LISDEXAMFETAMINE DIMESYLATE 40 MG/1
40 CAPSULE ORAL DAILY
Qty: 30 CAPSULE | Refills: 0 | Status: SHIPPED | OUTPATIENT
Start: 2024-05-02 | End: 2024-06-01

## 2024-03-01 RX ORDER — LISDEXAMFETAMINE DIMESYLATE 40 MG/1
40 CAPSULE ORAL DAILY
Qty: 30 CAPSULE | Refills: 0 | Status: CANCELLED | OUTPATIENT
Start: 2024-03-01 | End: 2024-03-31

## 2024-03-01 RX ORDER — LISDEXAMFETAMINE DIMESYLATE 40 MG/1
40 CAPSULE ORAL DAILY
Qty: 30 CAPSULE | Refills: 0 | Status: CANCELLED | OUTPATIENT
Start: 2024-04-01 | End: 2024-05-01

## 2024-03-01 RX ORDER — LISDEXAMFETAMINE DIMESYLATE 40 MG/1
40 CAPSULE ORAL DAILY
Qty: 30 CAPSULE | Refills: 0 | Status: SHIPPED | OUTPATIENT
Start: 2024-03-01 | End: 2024-03-31

## 2024-03-01 RX ORDER — LISDEXAMFETAMINE DIMESYLATE 40 MG/1
40 CAPSULE ORAL DAILY
Qty: 30 CAPSULE | Refills: 0 | Status: CANCELLED | OUTPATIENT
Start: 2024-05-02 | End: 2024-06-01

## 2024-03-01 RX ORDER — LISDEXAMFETAMINE DIMESYLATE 40 MG/1
40 CAPSULE ORAL EVERY MORNING
COMMUNITY
Start: 2024-02-14 | End: 2024-06-13

## 2024-03-01 RX ORDER — FLUCONAZOLE 150 MG/1
150 TABLET ORAL ONCE
Qty: 2 TABLET | Refills: 0 | Status: SHIPPED | OUTPATIENT
Start: 2024-03-01 | End: 2024-03-01

## 2024-03-01 RX ORDER — LISDEXAMFETAMINE DIMESYLATE 40 MG/1
40 CAPSULE ORAL DAILY
Qty: 30 CAPSULE | Refills: 0 | Status: SHIPPED | OUTPATIENT
Start: 2024-04-01 | End: 2024-05-01

## 2024-03-01 NOTE — PROGRESS NOTES
SUBJECTIVE:    Cara Payne, is a 23 year old female presenting for the below:     1. ADHD medication check. Diagnosed in childhood. Predominantly combined inattentive / hyperactive.    Taking vyvanse 40mg daily which assists with focus and task completion  Denies any palpitations, insomnia, decreased appetite, weight loss.  White discharge, thick - noticed 2 weeks ago, history of recurrent yeast infections - had been [facility]. Multiple yeast infections in the past few months.     OBJECTIVE:  Vitals:    03/01/24 1330   BP: 127/77   Pulse: 82   SpO2: 97%   Weight: 74.4 kg (164 lb)    Body mass index is 24.22 kg/m .  General: no acute distress, cooperative with exam.  Lungs: clear to auscultation bilaterally, normal respiratory effort.  Heart: regular rate, normal S1 and S2, no murmurs.   Neuro: grossly intact   Psych: mental status normal, mood and affect appropriate.        9/8/2022     2:37 PM 1/30/2023    11:00 AM 7/5/2023     4:26 PM   PHQ   PHQ-9 Total Score 3 7 1   Q9: Thoughts of better off dead/self-harm past 2 weeks Not at all Not at all Not at all           4/6/2022     4:24 PM 1/30/2023    11:00 AM 7/5/2023     4:26 PM   EVELIA-7 SCORE   Total Score 3 2 1       ASSESSMENT / PLAN:      Diagnoses and all orders for this visit:    Attention deficit hyperactivity disorder (ADHD), unspecified ADHD type  -     lisdexamfetamine (VYVANSE) 40 MG capsule; Take 1 capsule (40 mg) by mouth daily for 30 days  -     lisdexamfetamine (VYVANSE) 40 MG capsule; Take 1 capsule (40 mg) by mouth daily for 30 days  -     lisdexamfetamine (VYVANSE) 40 MG capsule; Take 1 capsule (40 mg) by mouth daily for 30 days  Discussed common side effects of stimulant medication.  [place]. Appropriate refill history observed.   Controlled substance agreement on file.  See problem list for most up to date refill plan.     Yeast infection of the vagina  -     Basic metabolic  panel; Future  -     Hemoglobin A1c; Future  -     fluconazole (DIFLUCAN) 150 MG tablet; Take 1 tablet (150 mg) by mouth once for 1 dose May repeat dose in 72 hours if not having complete resolution of symptoms  - Given repeat history of yeast infections will do some lab work today, discussed vulvar-vaginal health today as well   - Education about adverse effects of prescription medication discussed  - Red flags that warrant emergent evaluation discussed  - Patient verbalized understanding and is agreeable to the discussed plan of care.      Recurrent infections  -     Basic metabolic panel; Future  -     Hemoglobin A1c; Future    Vaginal itching  -     Wet Prep ([facility])    Vaginal discharge  -     Wet Prep ([facility])  -     Urogenital Ureaplasma and Mycoplasma Species by PCR; Future    Other orders  -     Cancel: COVID-19 12+ (2023-24) (MODERNA)

## 2024-03-03 ENCOUNTER — MYC REFILL (OUTPATIENT)
Dept: FAMILY MEDICINE | Facility: CLINIC | Age: 24
End: 2024-03-03

## 2024-03-03 DIAGNOSIS — F33.42 RECURRENT MAJOR DEPRESSIVE DISORDER, IN FULL REMISSION (H): ICD-10-CM

## 2024-03-04 NOTE — TELEPHONE ENCOUNTER
Patient sent [employer] refill request for sertraline 50mg #135 si.5 tabs (75mg) qd    Last visit: 3/1/23 with Kimberlee Paula NP for ADHD med check; 23 for depression PHQ  Med last ordered: 23     4:24 PM 2023    11:00 AM 2023     4:26 PM   EVELIA-7 SCORE   Total Score 3 2 1            2022     2:37 PM 2023    11:00 AM 2023     4:26 PM   PHQ   PHQ-9 Total Score 3 7 1   Q9: Thoughts of better off dead/self-harm past 2 weeks Not at all Not at all Not at all      Plan: routed to Kimberlee Paula NP for review.  Linda Diamond RN

## 2024-03-05 LAB
M GENITALIUM DNA SPEC QL NAA+PROBE: DETECTED
M HOMINIS DNA SPEC QL NAA+PROBE: NOT DETECTED
U PARVUM DNA SPEC QL NAA+PROBE: DETECTED
U UREALYTICUM DNA SPEC QL NAA+PROBE: NOT DETECTED

## 2024-03-06 RX ORDER — DOXYCYCLINE 100 MG/1
100 CAPSULE ORAL 2 TIMES DAILY
Qty: 14 CAPSULE | Refills: 0 | Status: SHIPPED | OUTPATIENT
Start: 2024-03-06 | End: 2024-03-13

## 2024-03-18 DIAGNOSIS — N89.8 VAGINAL ITCHING: Primary | ICD-10-CM

## 2024-03-18 PROCEDURE — 87798 DETECT AGENT NOS DNA AMP: CPT

## 2024-03-18 PROCEDURE — 87563 M. GENITALIUM AMP PROBE: CPT

## 2024-03-21 LAB
M GENITALIUM DNA SPEC QL NAA+PROBE: DETECTED
M HOMINIS DNA SPEC QL NAA+PROBE: NOT DETECTED
U PARVUM DNA SPEC QL NAA+PROBE: NOT DETECTED
U UREALYTICUM DNA SPEC QL NAA+PROBE: NOT DETECTED

## 2024-03-22 DIAGNOSIS — A49.3 MYCOPLASMA INFECTION: Primary | ICD-10-CM

## 2024-03-22 RX ORDER — AZITHROMYCIN 250 MG/1
TABLET, FILM COATED ORAL
Qty: 10 TABLET | Refills: 0 | Status: SHIPPED | OUTPATIENT
Start: 2024-03-22 | End: 2024-03-26

## 2024-04-05 ENCOUNTER — OFFICE VISIT (OUTPATIENT)
Dept: FAMILY MEDICINE | Facility: CLINIC | Age: 24
End: 2024-04-05

## 2024-04-05 VITALS
BODY MASS INDEX: 24.2 KG/M2 | SYSTOLIC BLOOD PRESSURE: 118 MMHG | OXYGEN SATURATION: 98 % | TEMPERATURE: 98.6 F | DIASTOLIC BLOOD PRESSURE: 79 MMHG | WEIGHT: 169 LBS | HEART RATE: 101 BPM | HEIGHT: 70 IN

## 2024-04-05 DIAGNOSIS — J02.9 ACUTE PHARYNGITIS, UNSPECIFIED ETIOLOGY: Primary | ICD-10-CM

## 2024-04-05 LAB
INFLUENZA A (RMG): NEGATIVE
INFLUENZA B (RMG): NEGATIVE
S PYO AG THROAT QL IA.RAPID: NEGATIVE
SARS ANTIGEN (RMG): NEGATIVE

## 2024-04-05 PROCEDURE — 87880 STREP A ASSAY W/OPTIC: CPT

## 2024-04-05 PROCEDURE — 87428 SARSCOV & INF VIR A&B AG IA: CPT

## 2024-04-05 PROCEDURE — 99213 OFFICE O/P EST LOW 20 MIN: CPT

## 2024-04-05 PROCEDURE — 87081 CULTURE SCREEN ONLY: CPT | Mod: ORL

## 2024-04-05 ASSESSMENT — PATIENT HEALTH QUESTIONNAIRE - PHQ9
5. POOR APPETITE OR OVEREATING: SEVERAL DAYS
SUM OF ALL RESPONSES TO PHQ QUESTIONS 1-9: 0

## 2024-04-05 ASSESSMENT — ANXIETY QUESTIONNAIRES
GAD7 TOTAL SCORE: 4
GAD7 TOTAL SCORE: 4
7. FEELING AFRAID AS IF SOMETHING AWFUL MIGHT HAPPEN: NOT AT ALL
3. WORRYING TOO MUCH ABOUT DIFFERENT THINGS: SEVERAL DAYS
6. BECOMING EASILY ANNOYED OR IRRITABLE: SEVERAL DAYS
IF YOU CHECKED OFF ANY PROBLEMS ON THIS QUESTIONNAIRE, HOW DIFFICULT HAVE THESE PROBLEMS MADE IT FOR YOU TO DO YOUR WORK, TAKE CARE OF THINGS AT HOME, OR GET ALONG WITH OTHER PEOPLE: SOMEWHAT DIFFICULT
1. FEELING NERVOUS, ANXIOUS, OR ON EDGE: SEVERAL DAYS
2. NOT BEING ABLE TO STOP OR CONTROL WORRYING: NOT AT ALL
5. BEING SO RESTLESS THAT IT IS HARD TO SIT STILL: NOT AT ALL

## 2024-04-05 NOTE — PROGRESS NOTES
"  Assessment & Plan     Acute pharyngitis, unspecified etiology  - suspect viral etiology - supportive cares discussed  - Red flags that warrant emergent evaluation discussed  -Patient verbalized understanding and is agreeable to the discussed plan of care.    - Influenza + SARS Antigen ([facility])  - Rapid Strep ([facility])  - Beta strep group A culture    See Patient Instructions    No follow-ups on file.    Santa Marroquin is a 23 year old, presenting for the following health issues:  URI (Rapid onset of sore throat, body aches, brain fog, hot/cold flashes/Denies SOB and chest pain/Has not done home covid testing/Sx onset: 04/04/2024/)    HPI     Sore throat, brain fog, fever, body aches, chills - insidious onset yesterday - no known exposures         Review of Systems  Constitutional, HEENT, cardiovascular, pulmonary, gi and gu systems are negative, except as otherwise noted.      Objective    /79   Pulse 101   Temp 98.6  F (37  C)   Ht 1.784 m (5' 10.25\")   Wt 76.7 kg (169 lb)   SpO2 98%   BMI 24.08 kg/m    Body mass index is 24.08 kg/m .  Physical Exam   GENERAL: alert and no distress  EYES: Eyes grossly normal to inspection, PERRL and conjunctivae and sclerae normal  HENT: normal cephalic/atraumatic, ear canals and TM's normal, nose and mouth without ulcers or lesions, oropharynx clear, oral mucous membranes moist, tonsillar hypertrophy, tonsillar erythema, and tonsillar exudate  NECK: cervical adenopathy anteiror, no asymmetry, masses, or scars, and thyroid normal to palpation  RESP: lungs clear to auscultation - no rales, rhonchi or wheezes  CV: regular rate and rhythm, normal S1 S2, no S3 or S4, no murmur, click or rub, no peripheral edema  MS: no gross musculoskeletal defects noted, no edema  SKIN: no suspicious lesions or rashes    Results for orders placed or performed in visit on 04/05/24   Influenza + SARS Antigen ([facility])     Status: None   Result Value Ref Range    INFLUENZA A ([facility]) Negative " Negative    INFLUENZA B ([facility]) Negative Negative    SARS ANTIGEN ([facility]) Negative Negative   Rapid Strep ([facility])     Status: None   Result Value Ref Range    Rapid Strep A Screen Negative    Beta strep group A culture     Status: None    Specimen: Throat; Swab   Result Value Ref Range    Culture No beta hemolytic Streptococcus isolated            Signed Electronically by: MANUELA Malik CNP

## 2024-04-07 LAB — BACTERIA SPEC CULT: NORMAL

## 2024-04-19 ENCOUNTER — TELEPHONE (OUTPATIENT)
Dept: FAMILY MEDICINE | Facility: CLINIC | Age: 24
End: 2024-04-19

## 2024-04-19 DIAGNOSIS — Z30.41 ENCOUNTER FOR SURVEILLANCE OF CONTRACEPTIVE PILLS: ICD-10-CM

## 2024-04-19 RX ORDER — NORETHINDRONE ACETATE AND ETHINYL ESTRADIOL 1MG-20(21)
1 KIT ORAL DAILY
Qty: 84 TABLET | Refills: 1 | Status: SHIPPED | OUTPATIENT
Start: 2024-04-19 | End: 2024-09-23

## 2024-04-19 NOTE — TELEPHONE ENCOUNTER
Patient called requesting refill of birth control.     (Z30.41) Encounter for surveillance of contraceptive pills  Comment: prescription refilled per patient request  Plan: norethindrone-ethinyl estradiol (JUNEL FE 1/20)        1-20 MG-MCG tablet

## 2024-05-07 ENCOUNTER — MYC REFILL (OUTPATIENT)
Dept: FAMILY MEDICINE | Facility: CLINIC | Age: 24
End: 2024-05-07

## 2024-05-07 DIAGNOSIS — F90.9 ATTENTION DEFICIT HYPERACTIVITY DISORDER (ADHD), UNSPECIFIED ADHD TYPE: Chronic | ICD-10-CM

## 2024-05-07 RX ORDER — LISDEXAMFETAMINE DIMESYLATE 40 MG/1
40 CAPSULE ORAL DAILY
Qty: 30 CAPSULE | Refills: 0 | Status: CANCELLED | OUTPATIENT
Start: 2024-05-07

## 2024-05-08 ENCOUNTER — MYC REFILL (OUTPATIENT)
Dept: FAMILY MEDICINE | Facility: CLINIC | Age: 24
End: 2024-05-08

## 2024-05-08 DIAGNOSIS — F33.42 RECURRENT MAJOR DEPRESSIVE DISORDER, IN FULL REMISSION (H): ICD-10-CM

## 2024-05-08 DIAGNOSIS — Z30.41 ENCOUNTER FOR SURVEILLANCE OF CONTRACEPTIVE PILLS: ICD-10-CM

## 2024-05-08 RX ORDER — NORETHINDRONE ACETATE AND ETHINYL ESTRADIOL 1MG-20(21)
1 KIT ORAL DAILY
Qty: 84 TABLET | Refills: 1 | Status: CANCELLED | OUTPATIENT
Start: 2024-05-08

## 2024-06-07 ENCOUNTER — MYC REFILL (OUTPATIENT)
Dept: FAMILY MEDICINE | Facility: CLINIC | Age: 24
End: 2024-06-07

## 2024-06-07 DIAGNOSIS — F90.9 ATTENTION DEFICIT HYPERACTIVITY DISORDER (ADHD), UNSPECIFIED ADHD TYPE: Primary | ICD-10-CM

## 2024-06-07 RX ORDER — LISDEXAMFETAMINE DIMESYLATE 40 MG/1
40 CAPSULE ORAL EVERY MORNING
Status: CANCELLED | OUTPATIENT
Start: 2024-06-07

## 2024-06-07 RX ORDER — LISDEXAMFETAMINE DIMESYLATE 40 MG/1
40 CAPSULE ORAL DAILY
Qty: 30 CAPSULE | Refills: 0 | Status: SHIPPED | OUTPATIENT
Start: 2024-08-06 | End: 2024-09-06

## 2024-06-07 RX ORDER — LISDEXAMFETAMINE DIMESYLATE 40 MG/1
40 CAPSULE ORAL DAILY
Qty: 30 CAPSULE | Refills: 0 | Status: SHIPPED | OUTPATIENT
Start: 2024-07-07 | End: 2024-08-06

## 2024-06-07 RX ORDER — LISDEXAMFETAMINE DIMESYLATE 40 MG/1
40 CAPSULE ORAL DAILY
Qty: 30 CAPSULE | Refills: 0 | Status: SHIPPED | OUTPATIENT
Start: 2024-06-07 | End: 2024-06-13

## 2024-06-07 NOTE — TELEPHONE ENCOUNTER
Controlled Substance Refill Request for: Vynanse 40mg   Last refill: 5/9/24  Last clinic visit: 3/1/24 for ADHD medication check   Clinic visit frequency required: Q 6  months  Next appt: due Sept 2024  Controlled substance agreement on file: Yes:  Date 7/5/23.  Tox screen done 7/5/23    [place]  checked, fill history below. Refill routed to Kimberlee Paula CNP for review.

## 2024-06-10 ENCOUNTER — VIRTUAL VISIT (OUTPATIENT)
Dept: FAMILY MEDICINE | Facility: CLINIC | Age: 24
End: 2024-06-10

## 2024-06-10 DIAGNOSIS — B37.31 CANDIDIASIS OF VULVA AND VAGINA: ICD-10-CM

## 2024-06-10 DIAGNOSIS — N89.8 VAGINAL ITCHING: ICD-10-CM

## 2024-06-10 DIAGNOSIS — F90.9 ATTENTION DEFICIT HYPERACTIVITY DISORDER (ADHD), UNSPECIFIED ADHD TYPE: Primary | ICD-10-CM

## 2024-06-10 PROCEDURE — 99214 OFFICE O/P EST MOD 30 MIN: CPT | Mod: 95

## 2024-06-10 RX ORDER — FLUCONAZOLE 150 MG/1
150 TABLET ORAL ONCE
Qty: 1 TABLET | Refills: 1 | Status: SHIPPED | OUTPATIENT
Start: 2024-06-10 | End: 2024-06-10

## 2024-06-10 NOTE — PROGRESS NOTES
Cara is a 23 year old who is being evaluated via a billable video visit.    What phone number would you like to be contacted at? [phone]   How would you like to obtain your AVS? MyChart      Assessment & Plan     Attention deficit hyperactivity disorder (ADHD), unspecified ADHD type  - refills provided for while patient is in [place], once she has a more consistent schedule and done with school would like to look into going off of vyvanse     Vaginal itching  - given improvement after monistat use, suspect fungal and she has a history of this as well - prescription sent for fluconazole.   - patient to have STD screening done given recent unprotected sex encounter   - Patient verbalized understanding and is agreeable to the discussed plan of care.        See Patient Instructions    No follow-ups on file.    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 Has tried going off of it and has '3 day withdrawal' brain fog and lethargy. It does 'make life easier' interested in decreasing dose        Review of Systems  Constitutional, HEENT, cardiovascular, pulmonary, gi and gu systems are negative, except as otherwise noted.      Objective           Vitals:  No vitals were obtained today due to virtual visit.    Physical Exam   GENERAL: alert and no distress  EYES: Eyes grossly normal to inspection.  No discharge or erythema, or obvious scleral/conjunctival abnormalities.  RESP: No audible wheeze, cough, or visible cyanosis.    SKIN: Visible skin clear. No significant rash, abnormal pigmentation or lesions.  NEURO: Cranial  nerves grossly intact.  Mentation and speech appropriate for age.  PSYCH: Appropriate affect, tone, and pace of words    No results found for this or any previous visit (from the past 24 hour(s)).      Video-Visit Details    Type of service:  Video Visit   Originating Location (pt. Location): Home    Distant Location (provider location):  On-site  Platform used for Video Visit: Vinny  Signed Electronically by: MANUELA Malik CNP

## 2024-06-13 ENCOUNTER — MYC REFILL (OUTPATIENT)
Dept: FAMILY MEDICINE | Facility: CLINIC | Age: 24
End: 2024-06-13

## 2024-06-13 DIAGNOSIS — F90.9 ATTENTION DEFICIT HYPERACTIVITY DISORDER (ADHD), UNSPECIFIED ADHD TYPE: Primary | ICD-10-CM

## 2024-06-13 RX ORDER — LISDEXAMFETAMINE DIMESYLATE 40 MG/1
40 CAPSULE ORAL EVERY MORNING
Qty: 30 CAPSULE | Refills: 0 | Status: SHIPPED | OUTPATIENT
Start: 2024-06-13 | End: 2024-06-14

## 2024-06-13 NOTE — TELEPHONE ENCOUNTER
Patient requesting lisdexamfetamine (Vyvanse) 40mg rx pharmacy transfer due to supply shortage at pharmacy rx was sent to on 6/7/24.     Last related visit: 6/10/24 virtual visit with Kimberlee Paula NP     Plan: routed request to Kimberlee Paula NP for review  Linda Diamond RN

## 2024-06-14 RX ORDER — LISDEXAMFETAMINE DIMESYLATE 40 MG/1
40 CAPSULE ORAL DAILY
Qty: 30 CAPSULE | Refills: 0 | Status: SHIPPED | OUTPATIENT
Start: 2024-06-14 | End: 2024-07-14

## 2024-06-14 RX ORDER — LISDEXAMFETAMINE DIMESYLATE 40 MG/1
40 CAPSULE ORAL DAILY
Qty: 30 CAPSULE | Refills: 0 | Status: SHIPPED | OUTPATIENT
Start: 2024-07-14 | End: 2024-08-13

## 2024-06-14 RX ORDER — LISDEXAMFETAMINE DIMESYLATE 40 MG/1
40 CAPSULE ORAL DAILY
Qty: 30 CAPSULE | Refills: 0 | Status: SHIPPED | OUTPATIENT
Start: 2024-08-13 | End: 2024-09-12

## 2024-06-14 NOTE — TELEPHONE ENCOUNTER
Patient calling and states [place] will not fill this prescription for her even when sent by [facility].  I called the pharmacy to confirm this and they state for the last couple years that they will not fill out of state C2 medications    Setting up new Rx so mom can come and  medication and mail to the patient.      [employer]    Local print call Leonila (mom) at [phone]

## 2024-06-14 NOTE — TELEPHONE ENCOUNTER
06/14/24 4:10 PM   Patient calls clinic regarding Vyvanse prescriptions. 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 Pharmacy can not fill because prescriber is not licensed in [place].   Our office called pharmacy and confirmed due to state laws they can not fill C2 prescriptions ordered by out of state prescribers.     Plan: Kimberlee Paula NP printed and signed 3 written prescriptions for vyvanse 40mg QD #30 (July August September). Patient's mother will pick these up at our .   Linda Diamond RN

## 2024-06-16 ENCOUNTER — HEALTH MAINTENANCE LETTER (OUTPATIENT)
Age: 24
End: 2024-06-16

## 2024-09-05 ENCOUNTER — MYC REFILL (OUTPATIENT)
Dept: FAMILY MEDICINE | Facility: CLINIC | Age: 24
End: 2024-09-05

## 2024-09-05 DIAGNOSIS — F90.9 ATTENTION DEFICIT HYPERACTIVITY DISORDER (ADHD), UNSPECIFIED ADHD TYPE: ICD-10-CM

## 2024-09-05 RX ORDER — LISDEXAMFETAMINE DIMESYLATE 40 MG/1
40 CAPSULE ORAL DAILY
Qty: 30 CAPSULE | Refills: 0 | Status: CANCELLED | OUTPATIENT
Start: 2024-09-05

## 2024-09-09 RX ORDER — LISDEXAMFETAMINE DIMESYLATE 40 MG/1
40 CAPSULE ORAL DAILY
Qty: 30 CAPSULE | Refills: 0 | Status: SHIPPED | OUTPATIENT
Start: 2024-11-06 | End: 2024-12-06

## 2024-09-09 RX ORDER — LISDEXAMFETAMINE DIMESYLATE 40 MG/1
40 CAPSULE ORAL DAILY
Qty: 30 CAPSULE | Refills: 0 | Status: SHIPPED | OUTPATIENT
Start: 2024-10-08 | End: 2024-11-07

## 2024-09-09 RX ORDER — LISDEXAMFETAMINE DIMESYLATE 40 MG/1
40 CAPSULE ORAL DAILY
Qty: 30 CAPSULE | Refills: 0 | Status: SHIPPED | OUTPATIENT
Start: 2024-09-11 | End: 2024-10-11

## 2024-09-23 ENCOUNTER — OFFICE VISIT (OUTPATIENT)
Dept: FAMILY MEDICINE | Facility: CLINIC | Age: 24
End: 2024-09-23

## 2024-09-23 VITALS
WEIGHT: 165 LBS | OXYGEN SATURATION: 98 % | HEIGHT: 70 IN | HEART RATE: 87 BPM | BODY MASS INDEX: 23.62 KG/M2 | DIASTOLIC BLOOD PRESSURE: 65 MMHG | SYSTOLIC BLOOD PRESSURE: 94 MMHG

## 2024-09-23 DIAGNOSIS — Z23 IMMUNIZATION DUE: ICD-10-CM

## 2024-09-23 DIAGNOSIS — N89.8 VAGINAL IRRITATION: ICD-10-CM

## 2024-09-23 DIAGNOSIS — F33.42 RECURRENT MAJOR DEPRESSIVE DISORDER, IN FULL REMISSION (H): ICD-10-CM

## 2024-09-23 DIAGNOSIS — L30.9 DERMATITIS OF LIP: ICD-10-CM

## 2024-09-23 DIAGNOSIS — Z00.00 ROUTINE GENERAL MEDICAL EXAMINATION AT A HEALTH CARE FACILITY: Primary | ICD-10-CM

## 2024-09-23 DIAGNOSIS — Z30.41 ENCOUNTER FOR SURVEILLANCE OF CONTRACEPTIVE PILLS: ICD-10-CM

## 2024-09-23 DIAGNOSIS — Z82.49 FAMILY HISTORY OF ISCHEMIC HEART DISEASE: ICD-10-CM

## 2024-09-23 LAB
ALBUMIN SERPL BCG-MCNC: 4.5 G/DL (ref 3.5–5.2)
ALP SERPL-CCNC: 47 U/L (ref 40–150)
ALT SERPL W P-5'-P-CCNC: 16 U/L (ref 0–50)
ANION GAP SERPL CALCULATED.3IONS-SCNC: 12 MMOL/L (ref 7–15)
AST SERPL W P-5'-P-CCNC: 27 U/L (ref 0–45)
BILIRUB SERPL-MCNC: 0.3 MG/DL
BUN SERPL-MCNC: 25.3 MG/DL (ref 6–20)
CALCIUM SERPL-MCNC: 9.5 MG/DL (ref 8.8–10.4)
CHLORIDE SERPL-SCNC: 103 MMOL/L (ref 98–107)
CHOLESTEROL: 181 MG/DL (ref 100–199)
CLUE CELLS: NORMAL
CREAT SERPL-MCNC: 0.71 MG/DL (ref 0.51–0.95)
EGFRCR SERPLBLD CKD-EPI 2021: >90 ML/MIN/1.73M2
FASTING STATUS PATIENT QL REPORTED: NO
FASTING?: NO
GLUCOSE SERPL-MCNC: 79 MG/DL (ref 70–99)
HCO3 SERPL-SCNC: 24 MMOL/L (ref 22–29)
HDL (RMG): 57 MG/DL (ref 40–?)
LDL CALCULATED (RMG): 103 MG/DL (ref 0–130)
POTASSIUM SERPL-SCNC: 4.2 MMOL/L (ref 3.4–5.3)
PROT SERPL-MCNC: 7.3 G/DL (ref 6.4–8.3)
SODIUM SERPL-SCNC: 139 MMOL/L (ref 135–145)
TRICHOMONAS (WET PREP): NORMAL
TRIGLYCERIDES (RMG): 100 MG/DL (ref 0–149)
YEAST (WET PREP): NORMAL

## 2024-09-23 PROCEDURE — 83695 ASSAY OF LIPOPROTEIN(A): CPT | Mod: ORL

## 2024-09-23 PROCEDURE — 90661 CCIIV3 VAC ABX FR 0.5 ML IM: CPT

## 2024-09-23 PROCEDURE — 90471 IMMUNIZATION ADMIN: CPT

## 2024-09-23 PROCEDURE — 80053 COMPREHEN METABOLIC PANEL: CPT | Mod: ORL

## 2024-09-23 PROCEDURE — 87210 SMEAR WET MOUNT SALINE/INK: CPT

## 2024-09-23 PROCEDURE — 87798 DETECT AGENT NOS DNA AMP: CPT

## 2024-09-23 PROCEDURE — 87563 M. GENITALIUM AMP PROBE: CPT

## 2024-09-23 PROCEDURE — 36415 COLL VENOUS BLD VENIPUNCTURE: CPT

## 2024-09-23 PROCEDURE — 99214 OFFICE O/P EST MOD 30 MIN: CPT | Mod: 25

## 2024-09-23 PROCEDURE — 80061 LIPID PANEL: CPT | Mod: QW

## 2024-09-23 PROCEDURE — 99395 PREV VISIT EST AGE 18-39: CPT | Mod: 25

## 2024-09-23 RX ORDER — NORETHINDRONE ACETATE AND ETHINYL ESTRADIOL 1MG-20(21)
1 KIT ORAL DAILY
Qty: 84 TABLET | Refills: 3 | Status: SHIPPED | OUTPATIENT
Start: 2024-09-23

## 2024-09-23 RX ORDER — TRIAMCINOLONE ACETONIDE 0.25 MG/G
OINTMENT TOPICAL 2 TIMES DAILY
Qty: 15 G | Refills: 0 | Status: SHIPPED | OUTPATIENT
Start: 2024-09-23

## 2024-09-23 ASSESSMENT — ANXIETY QUESTIONNAIRES
1. FEELING NERVOUS, ANXIOUS, OR ON EDGE: SEVERAL DAYS
2. NOT BEING ABLE TO STOP OR CONTROL WORRYING: NOT AT ALL
GAD7 TOTAL SCORE: 1
7. FEELING AFRAID AS IF SOMETHING AWFUL MIGHT HAPPEN: NOT AT ALL
IF YOU CHECKED OFF ANY PROBLEMS ON THIS QUESTIONNAIRE, HOW DIFFICULT HAVE THESE PROBLEMS MADE IT FOR YOU TO DO YOUR WORK, TAKE CARE OF THINGS AT HOME, OR GET ALONG WITH OTHER PEOPLE: NOT DIFFICULT AT ALL
GAD7 TOTAL SCORE: 1
3. WORRYING TOO MUCH ABOUT DIFFERENT THINGS: NOT AT ALL
6. BECOMING EASILY ANNOYED OR IRRITABLE: NOT AT ALL
5. BEING SO RESTLESS THAT IT IS HARD TO SIT STILL: NOT AT ALL

## 2024-09-23 ASSESSMENT — PATIENT HEALTH QUESTIONNAIRE - PHQ9
SUM OF ALL RESPONSES TO PHQ QUESTIONS 1-9: 0
5. POOR APPETITE OR OVEREATING: NOT AT ALL

## 2024-09-23 NOTE — PATIENT INSTRUCTIONS
Boric acid       Patient Education   Preventive Care Advice   This is general advice given by our system to help you stay healthy. However, your care team may have specific advice just for you. Please talk to your care team about your preventive care needs.  Nutrition  Eat 5 or more servings of fruits and vegetables each day.  Try wheat bread, brown rice and whole grain pasta (instead of white bread, rice, and pasta).  Get enough calcium and vitamin D. Check the label on foods and aim for 100% of the RDA (recommended daily allowance).  Lifestyle  Exercise at least 150 minutes each week  (30 minutes a day, 5 days a week).  Do muscle strengthening activities 2 days a week. These help control your weight and prevent disease.  No smoking.  Wear sunscreen to prevent skin cancer.  Have a dental exam and cleaning every 6 months.  Yearly exams  See your health care team every year to talk about:  Any changes in your health.  Any medicines your care team has prescribed.  Preventive care, family planning, and ways to prevent chronic diseases.  Shots (vaccines)   HPV shots (up to age 26), if you've never had them before.  Hepatitis B shots (up to age 59), if you've never had them before.  COVID-19 shot: Get this shot when it's due.  Flu shot: Get a flu shot every year.  Tetanus shot: Get a tetanus shot every 10 years.  Pneumococcal, hepatitis A, and RSV shots: Ask your care team if you need these based on your risk.  Shingles shot (for age 50 and up)  General health tests  Diabetes screening:  Starting at age 35, Get screened for diabetes at least every 3 years.  If you are younger than age 35, ask your care team if you should be screened for diabetes.  Cholesterol test: At age 39, start having a cholesterol test every 5 years, or more often if advised.  Bone density scan (DEXA): At age 50, ask your care team if you should have this scan for osteoporosis (brittle bones).  Hepatitis C: Get tested at least once in your  life.  STIs (sexually transmitted infections)  Before age 24: Ask your care team if you should be screened for STIs.  After age 24: Get screened for STIs if you're at risk. You are at risk for STIs (including HIV) if:  You are sexually active with more than one person.  You don't use condoms every time.  You or a partner was diagnosed with a sexually transmitted infection.  If you are at risk for HIV, ask about PrEP medicine to prevent HIV.  Get tested for HIV at least once in your life, whether you are at risk for HIV or not.  Cancer screening tests  Cervical cancer screening: If you have a cervix, begin getting regular cervical cancer screening tests starting at age 21.  Breast cancer scan (mammogram): If you've ever had breasts, begin having regular mammograms starting at age 40. This is a scan to check for breast cancer.  Colon cancer screening: It is important to start screening for colon cancer at age 45.  Have a colonoscopy test every 10 years (or more often if you're at risk) Or, ask your provider about stool tests like a FIT test every year or Cologuard test every 3 years.  To learn more about your testing options, visit:   .  For help making a decision, visit:   https://bit.ly/xq24517.  Prostate cancer screening test: If you have a prostate, ask your care team if a prostate cancer screening test (PSA) at age 55 is right for you.  Lung cancer screening: If you are a current or former smoker ages 50 to 80, ask your care team if ongoing lung cancer screenings are right for you.  For informational purposes only. Not to replace the advice of your health care provider. Copyright   2023 [employer]. All rights reserved. Clinically reviewed by the [facility]. Gracious Eloise [id number] - REV 01/24.

## 2024-09-23 NOTE — PROGRESS NOTES
Preventive Care Visit  [facility]  MANUELA Malik CNP, Nurse Practitioner Primary Care  Sep 23, 2024      Assessment & Plan     Routine general medical examination at a health care facility  -Preventive health topics discussed including adequate exercise and healthy diet. Return to clinic in one year for preventative exam or sooner with any other concerns. Other issues discussed as noted below.     - Comprehensive metabolic panel  - Comprehensive metabolic panel  - VENOUS COLLECTION    Recurrent major depressive disorder, in full remission (H24)  -stable on sertraline,   - sertraline (ZOLOFT) 50 MG tablet  Dispense: 135 tablet; Refill: 3  - Comprehensive metabolic panel  - Comprehensive metabolic panel  - VENOUS COLLECTION    Encounter for surveillance of contraceptive pills  - refill provided, tolerating well   - norethindrone-ethinyl estradiol (JUNEL FE 1/20) 1-20 MG-MCG tablet  Dispense: 84 tablet; Refill: 3  - Comprehensive metabolic panel  - Comprehensive metabolic panel  - VENOUS COLLECTION    Dermatitis of lip  - suspect reactive to the exfoliating formula - continue use of hydrocortisone cream and if improving but not resolved can trial the kenalog ointment, use sunscreen to lips as well  - triamcinolone (KENALOG) 0.025 % external ointment  Dispense: 15 g; Refill: 0  - Comprehensive metabolic panel  - Comprehensive metabolic panel  - VENOUS COLLECTION    Vaginal irritation  - irritation has resolved, discuss sex toy cleanliness and will repeat testing today, could also trial intermittent boric acid to help with pH imbalances   - Urogenital Ureaplasma and Mycoplasma Species by PCR  - Wet Prep ([facility])  - Urogenital Ureaplasma and Mycoplasma Species by PCR  - VENOUS COLLECTION    Family history of ischemic heart disease  - Lipid Profile ([facility])  - Lipoprotein (a)  - Lipoprotein (a)  - VENOUS COLLECTION    Immunization due  - VACCINE ADMINISTRATION, INITIAL  - VENOUS COLLECTION        See Patient  Instructions    Return in about 53 weeks (around 9/29/2025) for Annual Wellness Visit.    Santa Marroquin is a 23 year old, presenting for the following:  Physical (Not fasting), Health Maintenance (PAP: UTD - 07/2023/Vaccines: Covid and flu due), and Derm Problem (Used a exfoliating peel on face, now has blisters on lips. Would like to have them checked.)         Health Care Directive  Patient does not have a Health Care Directive or Living Will: did not discuss today     HPI    1.) derm concern: 1 week ago did exfoliating peel then went out in the sun and next morning woke up with red sore spots on her lips.     2.) vulvovaginal irritation: 1 week ago had irritation - vibrator use and feels did not clean well - this has resolved.  She would like repeat wet prep and ureaplasma.  She has a new partner for 3 months - monogamous relationship, denies concerns for STDs        9/23/2024   General Health   How would you rate your overall physical health? Excellent   Feel stress (tense, anxious, or unable to sleep) Only a little            9/23/2024   Nutrition   Three or more servings of calcium each day? Yes   Diet: Regular (no restrictions)   How many servings of fruit and vegetables per day? (!) 2-3   How many sweetened beverages each day? 0-1            9/23/2024   Exercise   Days per week of moderate/strenous exercise 7 days   Average minutes spent exercising at this level 30 min            9/23/2024   Social Factors   Frequency of gathering with friends or relatives Three times a week            9/23/2024   Dental   Dentist two times every year? Yes               Today's PHQ-9 Score:       4/5/2024     3:39 PM   PHQ-9 SCORE   PHQ-9 Total Score 0           9/23/2024   Substance Use   Alcohol more than 3/day or more than 7/wk No   Do you use any other substances recreationally? No        Social History     Tobacco Use    Smoking status: Never    Smokeless tobacco: Never   Vaping Use    Vaping status: Never Used  "  Substance Use Topics    Alcohol use: No    Drug use: No          Mammogram Screening - Patient under 40 years of age: Routine Mammogram Screening not recommended.       History of abnormal Pap smear: No - age 30-64 HPV with reflex Pap every 5 years recommended             9/23/2024   Contraception/Family Planning   Questions about contraception or family planning No           Reviewed and updated as needed this visit by Provider     Meds                Past Medical History:   Diagnosis Date    ADHD (attention deficit hyperactivity disorder)     Anemia     Depression      Lab work is in process      Review of Systems  Constitutional, neuro, ENT, endocrine, pulmonary, cardiac, gastrointestinal, genitourinary, musculoskeletal, integument and psychiatric systems are negative, except as otherwise noted.     Objective    Exam  BP 94/65   Pulse 87   Ht 1.784 m (5' 10.25\")   Wt 74.8 kg (165 lb)   SpO2 98%   BMI 23.51 kg/m     Estimated body mass index is 23.51 kg/m  as calculated from the following:    Height as of this encounter: 1.784 m (5' 10.25\").    Weight as of this encounter: 74.8 kg (165 lb).    Physical Exam  GENERAL: alert and no distress  EYES: Eyes grossly normal to inspection, PERRL and conjunctivae and sclerae normal  HENT: ear canals and TM's normal, nose and mouth without ulcers or lesions  NECK: no adenopathy, no asymmetry, masses, or scars  RESP: lungs clear to auscultation - no rales, rhonchi or wheezes  CV: regular rate and rhythm, normal S1 S2, no S3 or S4, no murmur, click or rub, no peripheral edema  ABDOMEN: soft, nontender, no hepatosplenomegaly, no masses and bowel sounds normal  : declines pelvic   MS: no gross musculoskeletal defects noted, no edema  SKIN: no suspicious lesions or rashes  NEURO: Normal strength and tone, mentation intact and speech normal  PSYCH: mentation appears normal, affect normal/bright        Signed Electronically by: MANUELA Malik CNP    "

## 2024-09-24 LAB — APO A-I SERPL-MCNC: 108 MG/DL

## 2024-09-26 LAB
M GENITALIUM DNA SPEC QL NAA+PROBE: NOT DETECTED
M HOMINIS DNA SPEC QL NAA+PROBE: NOT DETECTED
U PARVUM DNA SPEC QL NAA+PROBE: NOT DETECTED
U UREALYTICUM DNA SPEC QL NAA+PROBE: NOT DETECTED

## 2024-11-14 ENCOUNTER — VIRTUAL VISIT (OUTPATIENT)
Dept: FAMILY MEDICINE | Facility: CLINIC | Age: 24
End: 2024-11-14

## 2024-11-14 DIAGNOSIS — F90.9 ATTENTION DEFICIT HYPERACTIVITY DISORDER (ADHD), UNSPECIFIED ADHD TYPE: ICD-10-CM

## 2024-11-14 DIAGNOSIS — N92.1 BREAKTHROUGH BLEEDING ON BIRTH CONTROL PILLS: Primary | ICD-10-CM

## 2024-11-14 PROCEDURE — G2211 COMPLEX E/M VISIT ADD ON: HCPCS | Mod: 95

## 2024-11-14 PROCEDURE — 99213 OFFICE O/P EST LOW 20 MIN: CPT | Mod: 95

## 2024-11-14 RX ORDER — LISDEXAMFETAMINE DIMESYLATE 40 MG/1
40 CAPSULE ORAL DAILY
Qty: 30 CAPSULE | Refills: 0 | Status: SHIPPED | OUTPATIENT
Start: 2024-11-14 | End: 2024-12-14

## 2024-11-14 RX ORDER — LISDEXAMFETAMINE DIMESYLATE 40 MG/1
40 CAPSULE ORAL DAILY
Qty: 30 CAPSULE | Refills: 0 | Status: SHIPPED | OUTPATIENT
Start: 2025-01-13 | End: 2025-02-12

## 2024-11-14 RX ORDER — LISDEXAMFETAMINE DIMESYLATE 40 MG/1
40 CAPSULE ORAL DAILY
Qty: 30 CAPSULE | Refills: 0 | Status: SHIPPED | OUTPATIENT
Start: 2024-12-14 | End: 2025-01-13

## 2024-11-14 NOTE — PROGRESS NOTES
Cara is a 24 year old who is being evaluated via a billable video visit.    What phone number would you like to be contacted at? [phone]   How would you like to obtain your AVS? MyChart      Assessment & Plan     Breakthrough bleeding on birth control pills  - only been one cycle, bleeding has stopped, no heavy bleeding, no cramping or vaginal discharge or STD concerns.  Patient to continue taking birth control and if this recurs during next cycle we can consider increasing estrogen dosing of pill, if bleeding cramping or any other symptoms arise patient should be evaluated in person  - Patient verbalized understanding and is agreeable to the discussed plan of care.      ADHD  -refills provided for patient today, she has her mother  her medications and then has them mailed to her in [place]       See Patient Instructions    No follow-ups on file.    Subjective   Cara is a 24 year old, presenting for the following health issues:  Vaginal Problem    HPI     LMP: about 10/31/24, unsure when menstrual period but knows within the 28 days of taking her birth control.  She had spotting after this period about 3 days ago - lighter bleeding.  Stopped today.  No pain, fever, body aches, chills.  3-4 years has been on Junel without issue.  Increased stress and not exercising 2 changes this month.        Review of Systems  Constitutional, HEENT, cardiovascular, pulmonary, gi and gu systems are negative, except as otherwise noted.      Objective           Vitals:  No vitals were obtained today due to virtual visit.    Physical Exam   GENERAL: alert and no distress  EYES: Eyes grossly normal to inspection.  No discharge or erythema, or obvious scleral/conjunctival abnormalities.  RESP: No audible wheeze, cough, or visible cyanosis.    SKIN: Visible skin clear. No significant rash, abnormal pigmentation or lesions.  NEURO: Cranial nerves grossly intact.  Mentation and speech appropriate for age.  PSYCH:  Appropriate affect, tone, and pace of words    No results found for this or any previous visit (from the past 24 hours).      Video-Visit Details    Type of service:  Video Visit   Originating Location (pt. Location): Home    Distant Location (provider location):  On-site  Platform used for Video Visit: Vinny  Signed Electronically by: MANUELA Malik CNP  Attempted to call twice, no answer

## 2024-12-05 ENCOUNTER — MYC REFILL (OUTPATIENT)
Dept: FAMILY MEDICINE | Facility: CLINIC | Age: 24
End: 2024-12-05

## 2024-12-05 DIAGNOSIS — F90.9 ATTENTION DEFICIT HYPERACTIVITY DISORDER (ADHD), UNSPECIFIED ADHD TYPE: ICD-10-CM

## 2024-12-05 RX ORDER — LISDEXAMFETAMINE DIMESYLATE 40 MG/1
40 CAPSULE ORAL DAILY
Qty: 30 CAPSULE | Refills: 0 | OUTPATIENT
Start: 2024-12-05

## 2025-01-01 ENCOUNTER — MYC REFILL (OUTPATIENT)
Dept: FAMILY MEDICINE | Facility: CLINIC | Age: 25
End: 2025-01-01

## 2025-01-01 DIAGNOSIS — F90.9 ATTENTION DEFICIT HYPERACTIVITY DISORDER (ADHD), UNSPECIFIED ADHD TYPE: ICD-10-CM

## 2025-01-02 RX ORDER — LISDEXAMFETAMINE DIMESYLATE 40 MG/1
40 CAPSULE ORAL DAILY
Qty: 30 CAPSULE | Refills: 0 | OUTPATIENT
Start: 2025-01-02